# Patient Record
Sex: FEMALE | Race: WHITE | NOT HISPANIC OR LATINO | URBAN - METROPOLITAN AREA
[De-identification: names, ages, dates, MRNs, and addresses within clinical notes are randomized per-mention and may not be internally consistent; named-entity substitution may affect disease eponyms.]

---

## 2019-10-02 ENCOUNTER — EMERGENCY (EMERGENCY)
Facility: HOSPITAL | Age: 79
LOS: 1 days | Discharge: HOME | End: 2019-10-02
Attending: EMERGENCY MEDICINE
Payer: MEDICARE

## 2019-10-02 VITALS
OXYGEN SATURATION: 100 % | SYSTOLIC BLOOD PRESSURE: 108 MMHG | HEART RATE: 64 BPM | DIASTOLIC BLOOD PRESSURE: 74 MMHG | TEMPERATURE: 98 F | RESPIRATION RATE: 16 BRPM

## 2019-10-02 VITALS
OXYGEN SATURATION: 96 % | SYSTOLIC BLOOD PRESSURE: 158 MMHG | HEART RATE: 78 BPM | HEIGHT: 63 IN | TEMPERATURE: 98 F | WEIGHT: 134.04 LBS | RESPIRATION RATE: 18 BRPM | DIASTOLIC BLOOD PRESSURE: 79 MMHG

## 2019-10-02 DIAGNOSIS — Z90.710 ACQUIRED ABSENCE OF BOTH CERVIX AND UTERUS: Chronic | ICD-10-CM

## 2019-10-02 LAB
ALBUMIN SERPL ELPH-MCNC: 4.1 G/DL — SIGNIFICANT CHANGE UP (ref 3.3–5)
ALP SERPL-CCNC: 99 U/L — SIGNIFICANT CHANGE UP (ref 40–120)
ALT FLD-CCNC: 11 U/L — SIGNIFICANT CHANGE UP (ref 10–45)
ANION GAP SERPL CALC-SCNC: 14 MMOL/L — SIGNIFICANT CHANGE UP (ref 5–17)
APPEARANCE UR: CLEAR — SIGNIFICANT CHANGE UP
APTT BLD: 30.2 SEC — SIGNIFICANT CHANGE UP (ref 27.5–36.3)
AST SERPL-CCNC: 9 U/L — LOW (ref 10–40)
BACTERIA # UR AUTO: ABNORMAL
BASE EXCESS BLDV CALC-SCNC: -1.8 MMOL/L — SIGNIFICANT CHANGE UP (ref -2–2)
BASOPHILS # BLD AUTO: 0.05 K/UL — SIGNIFICANT CHANGE UP (ref 0–0.2)
BASOPHILS NFR BLD AUTO: 0.3 % — SIGNIFICANT CHANGE UP (ref 0–2)
BILIRUB SERPL-MCNC: 0.5 MG/DL — SIGNIFICANT CHANGE UP (ref 0.2–1.2)
BILIRUB UR-MCNC: NEGATIVE — SIGNIFICANT CHANGE UP
BUN SERPL-MCNC: 34 MG/DL — HIGH (ref 7–23)
CA-I SERPL-SCNC: 1.22 MMOL/L — SIGNIFICANT CHANGE UP (ref 1.12–1.3)
CALCIUM SERPL-MCNC: 9.9 MG/DL — SIGNIFICANT CHANGE UP (ref 8.4–10.5)
CHLORIDE BLDV-SCNC: 109 MMOL/L — HIGH (ref 96–108)
CHLORIDE SERPL-SCNC: 104 MMOL/L — SIGNIFICANT CHANGE UP (ref 96–108)
CO2 BLDV-SCNC: 24 MMOL/L — SIGNIFICANT CHANGE UP (ref 22–30)
CO2 SERPL-SCNC: 21 MMOL/L — LOW (ref 22–31)
COLOR SPEC: SIGNIFICANT CHANGE UP
CREAT SERPL-MCNC: 1.22 MG/DL — SIGNIFICANT CHANGE UP (ref 0.5–1.3)
DIFF PNL FLD: ABNORMAL
EOSINOPHIL # BLD AUTO: 0.12 K/UL — SIGNIFICANT CHANGE UP (ref 0–0.5)
EOSINOPHIL NFR BLD AUTO: 0.8 % — SIGNIFICANT CHANGE UP (ref 0–6)
EPI CELLS # UR: 1 /HPF — SIGNIFICANT CHANGE UP
GAS PNL BLDV: 137 MMOL/L — SIGNIFICANT CHANGE UP (ref 135–145)
GAS PNL BLDV: SIGNIFICANT CHANGE UP
GAS PNL BLDV: SIGNIFICANT CHANGE UP
GLUCOSE BLDV-MCNC: 199 MG/DL — HIGH (ref 70–99)
GLUCOSE SERPL-MCNC: 212 MG/DL — HIGH (ref 70–99)
GLUCOSE UR QL: ABNORMAL
HCO3 BLDV-SCNC: 23 MMOL/L — SIGNIFICANT CHANGE UP (ref 21–29)
HCT VFR BLD CALC: 37.4 % — SIGNIFICANT CHANGE UP (ref 34.5–45)
HCT VFR BLDA CALC: 39 % — SIGNIFICANT CHANGE UP (ref 39–50)
HGB BLD CALC-MCNC: 12.8 G/DL — SIGNIFICANT CHANGE UP (ref 11.5–15.5)
HGB BLD-MCNC: 12.4 G/DL — SIGNIFICANT CHANGE UP (ref 11.5–15.5)
HYALINE CASTS # UR AUTO: 2 /LPF — SIGNIFICANT CHANGE UP (ref 0–2)
IMM GRANULOCYTES NFR BLD AUTO: 0.4 % — SIGNIFICANT CHANGE UP (ref 0–1.5)
INR BLD: 0.97 RATIO — SIGNIFICANT CHANGE UP (ref 0.88–1.16)
KETONES UR-MCNC: SIGNIFICANT CHANGE UP
LACTATE BLDV-MCNC: 1.3 MMOL/L — SIGNIFICANT CHANGE UP (ref 0.7–2)
LEUKOCYTE ESTERASE UR-ACNC: ABNORMAL
LYMPHOCYTES # BLD AUTO: 1.45 K/UL — SIGNIFICANT CHANGE UP (ref 1–3.3)
LYMPHOCYTES # BLD AUTO: 9.8 % — LOW (ref 13–44)
MCHC RBC-ENTMCNC: 28.6 PG — SIGNIFICANT CHANGE UP (ref 27–34)
MCHC RBC-ENTMCNC: 33.2 GM/DL — SIGNIFICANT CHANGE UP (ref 32–36)
MCV RBC AUTO: 86.2 FL — SIGNIFICANT CHANGE UP (ref 80–100)
MONOCYTES # BLD AUTO: 1.12 K/UL — HIGH (ref 0–0.9)
MONOCYTES NFR BLD AUTO: 7.6 % — SIGNIFICANT CHANGE UP (ref 2–14)
NEUTROPHILS # BLD AUTO: 12.01 K/UL — HIGH (ref 1.8–7.4)
NEUTROPHILS NFR BLD AUTO: 81.1 % — HIGH (ref 43–77)
NITRITE UR-MCNC: POSITIVE
NRBC # BLD: 0 /100 WBCS — SIGNIFICANT CHANGE UP (ref 0–0)
PCO2 BLDV: 42 MMHG — SIGNIFICANT CHANGE UP (ref 35–50)
PH BLDV: 7.36 — SIGNIFICANT CHANGE UP (ref 7.35–7.45)
PH UR: 6 — SIGNIFICANT CHANGE UP (ref 5–8)
PLATELET # BLD AUTO: 270 K/UL — SIGNIFICANT CHANGE UP (ref 150–400)
PO2 BLDV: 26 MMHG — SIGNIFICANT CHANGE UP (ref 25–45)
POTASSIUM BLDV-SCNC: 4.1 MMOL/L — SIGNIFICANT CHANGE UP (ref 3.5–5.3)
POTASSIUM SERPL-MCNC: 4.4 MMOL/L — SIGNIFICANT CHANGE UP (ref 3.5–5.3)
POTASSIUM SERPL-SCNC: 4.4 MMOL/L — SIGNIFICANT CHANGE UP (ref 3.5–5.3)
PROT SERPL-MCNC: 7.3 G/DL — SIGNIFICANT CHANGE UP (ref 6–8.3)
PROT UR-MCNC: SIGNIFICANT CHANGE UP
PROTHROM AB SERPL-ACNC: 11.2 SEC — SIGNIFICANT CHANGE UP (ref 10–12.9)
RBC # BLD: 4.34 M/UL — SIGNIFICANT CHANGE UP (ref 3.8–5.2)
RBC # FLD: 13.5 % — SIGNIFICANT CHANGE UP (ref 10.3–14.5)
RBC CASTS # UR COMP ASSIST: 40 /HPF — HIGH (ref 0–4)
SAO2 % BLDV: 40 % — LOW (ref 67–88)
SODIUM SERPL-SCNC: 139 MMOL/L — SIGNIFICANT CHANGE UP (ref 135–145)
SP GR SPEC: 1.02 — SIGNIFICANT CHANGE UP (ref 1.01–1.02)
UROBILINOGEN FLD QL: NEGATIVE — SIGNIFICANT CHANGE UP
WBC # BLD: 14.81 K/UL — HIGH (ref 3.8–10.5)
WBC # FLD AUTO: 14.81 K/UL — HIGH (ref 3.8–10.5)
WBC UR QL: 45 /HPF — HIGH (ref 0–5)

## 2019-10-02 PROCEDURE — 99284 EMERGENCY DEPT VISIT MOD MDM: CPT

## 2019-10-02 PROCEDURE — 83605 ASSAY OF LACTIC ACID: CPT

## 2019-10-02 PROCEDURE — 96365 THER/PROPH/DIAG IV INF INIT: CPT | Mod: XU

## 2019-10-02 PROCEDURE — 87186 SC STD MICRODIL/AGAR DIL: CPT

## 2019-10-02 PROCEDURE — 84132 ASSAY OF SERUM POTASSIUM: CPT

## 2019-10-02 PROCEDURE — 80053 COMPREHEN METABOLIC PANEL: CPT

## 2019-10-02 PROCEDURE — 85610 PROTHROMBIN TIME: CPT

## 2019-10-02 PROCEDURE — 85027 COMPLETE CBC AUTOMATED: CPT

## 2019-10-02 PROCEDURE — 85730 THROMBOPLASTIN TIME PARTIAL: CPT

## 2019-10-02 PROCEDURE — 81001 URINALYSIS AUTO W/SCOPE: CPT

## 2019-10-02 PROCEDURE — 87086 URINE CULTURE/COLONY COUNT: CPT

## 2019-10-02 PROCEDURE — 85014 HEMATOCRIT: CPT

## 2019-10-02 PROCEDURE — 82947 ASSAY GLUCOSE BLOOD QUANT: CPT

## 2019-10-02 PROCEDURE — 82330 ASSAY OF CALCIUM: CPT

## 2019-10-02 PROCEDURE — 51701 INSERT BLADDER CATHETER: CPT

## 2019-10-02 PROCEDURE — 84295 ASSAY OF SERUM SODIUM: CPT

## 2019-10-02 PROCEDURE — 82803 BLOOD GASES ANY COMBINATION: CPT

## 2019-10-02 PROCEDURE — 82435 ASSAY OF BLOOD CHLORIDE: CPT

## 2019-10-02 PROCEDURE — 99284 EMERGENCY DEPT VISIT MOD MDM: CPT | Mod: 25

## 2019-10-02 RX ORDER — CEFDINIR 250 MG/5ML
1 POWDER, FOR SUSPENSION ORAL
Qty: 14 | Refills: 0
Start: 2019-10-02 | End: 2019-10-08

## 2019-10-02 RX ORDER — CEFTRIAXONE 500 MG/1
1000 INJECTION, POWDER, FOR SOLUTION INTRAMUSCULAR; INTRAVENOUS ONCE
Refills: 0 | Status: COMPLETED | OUTPATIENT
Start: 2019-10-02 | End: 2019-10-02

## 2019-10-02 RX ADMIN — CEFTRIAXONE 1000 MILLIGRAM(S): 500 INJECTION, POWDER, FOR SOLUTION INTRAMUSCULAR; INTRAVENOUS at 16:18

## 2019-10-02 RX ADMIN — CEFTRIAXONE 100 MILLIGRAM(S): 500 INJECTION, POWDER, FOR SOLUTION INTRAMUSCULAR; INTRAVENOUS at 15:07

## 2019-10-02 NOTE — ED PROVIDER NOTE - PHYSICAL EXAMINATION
GEN: Well Appearing, Nontoxic, NAD  HEENT: NC/AT, Symm Facies. PERRL, EOMI, MMM, posterior pharynx clear  CV: No JVD/Bruits or stridor;  +S1S2, RRR w/o m/g/r  RESP: CTAB w/o w/r/r  ABD: Soft, nt/nd, +BS. No guarding/rebound. No RUQ tender, no CVAT  : external genitalia with granulomatous tissue on labia, enlarged.   EXT/MSK: No lower extremity edema or calf tenderness. WWP, palpable pulses. FROMx4  SKIN: No erythema, lesions or rash  Neuro: Grossly intact, AOX3 with normal speech, CN II-XII intact; Sensation intact, motor 5/5 throughout. Gait normal

## 2019-10-02 NOTE — ED PROVIDER NOTE - NSFOLLOWUPINSTRUCTIONS_ED_ALL_ED_FT
1. Continue to hydrate yourself well   2. Take Cefdinir as directed to completion   3. Follow-up with Dr. Hernández and your OBGYN this week for reevaluation and continued treatment   4. Return to the ER for any new worsening symptoms

## 2019-10-02 NOTE — ED ADULT NURSE REASSESSMENT NOTE - NS ED NURSE REASSESS COMMENT FT1
Bladder scan showed 247cc urine. Straight cath performed under sterile technique - 250cc urine clear, yellow urine noted to drainage bag. Hygienic and comfort measures provided. Pt repositioned for comfort. Bed locked in lowest position. Call bell within reach. Spouse at bedside. Urine specimens collected and sent. Will continue to monitor.

## 2019-10-02 NOTE — ED PROVIDER NOTE - ATTENDING CONTRIBUTION TO CARE
79yr F hx of htn, hl, dm not on insulin p/w recurrence of urinary frequency and dysuria despite two courses of antibiotics (cipro and another AB which she does not remember the name of). denies fever chills, n/v, abd pain, cp, sob, ... 79yr F hx of htn, hl, dm not on insulin p/w recurrence of urinary frequency and dysuria despite two courses of antibiotics (cipro and another AB which she does not remember the name of). denies fever chills, n/v, abd pain, cp, sob, change in bowel habits (usually 2 bm per week).  reports being scheduled by gyn and urology for oct 8th for a corrective procedure to help remove excess tissue to be able to urinate freely.  exam is unremarkable (pelvic not done)  will do labs, urine, and bladder scan. likely will require inpatient antibiotics if evidence of infection. 79yr F hx of htn, hl, dm not on insulin p/w recurrence of urinary frequency and dysuria despite two courses of antibiotics (cipro and another AB which she does not remember the name of). denies fever chills, n/v, abd pain, cp, sob, change in bowel habits (usually 2 bm per week).  reports being scheduled by gyn and urology for oct 8th for a corrective procedure to help remove excess tissue to be able to urinate freely.  exam is unremarkable (pelvic not done)  will do labs, urine, and bladder scan. likely will require inpatient antibiotics if evidence of infection.    clarified with pt's PMD that she was not on antibiotics recently, thus no concern for treatment failure. will send home on cefdinir (last time was positive). is agreeable for the plan, also offered rapid urology follow up if she chooses to have operation here.

## 2019-10-02 NOTE — ED PROVIDER NOTE - PATIENT PORTAL LINK FT
You can access the FollowMyHealth Patient Portal offered by [facility] by registering at the following website: [email or web address]. By joining [facility]’s FollowMyHealth portal, you will also be able to view your health information using other applications (apps) compatible with our system.

## 2019-10-02 NOTE — ED PROVIDER NOTE - NS ED ROS FT
Constitutional: No fever or chills  Eyes: No visual changes, eye pain or redness  HEENT: No throat pain, ear pain, nasal pain. No nose bleeding.  CV: No chest pain or lower extremity edema  Resp: No SOB no cough  GI: No abd pain. No nausea or vomiting. No diarrhea. No constipation.   : +dysuria, incontinence. No hematuria  MSK: No musculoskeletal pain  Skin: No rash  Neuro: No headache. No numbness or tingling. No weakness.

## 2019-10-02 NOTE — ED ADULT NURSE REASSESSMENT NOTE - NS ED NURSE REASSESS COMMENT FT1
Pt resting comfortably in bed. Pt denies pain/discomfort at this time. Ceftriaxone administered to pt as ordered. NAD noted. No adverse reaction noted. Pt given lunch tray, no dysphagia noted, tolerating PO intake well . Safety maintained. Spouse at bedside. Will continue to monitor. Pending dispo.

## 2019-10-02 NOTE — ED ADULT NURSE NOTE - OBJECTIVE STATEMENT
79F pt AxOx3 ambulatory to ED c/o lower abd pain and frequent urination w/ burning. Pt denies fever/chills. Pt denies hematuria. Pt denies dizziness/generalized weakness. On assessment, abd soft/NT/ND. Suprapubic tenderness noted to palp. Pt is afebrile. VSS. Pt is well in appearance. Resp even, unlabored. Pt ambulates w/ steady gait. #20G RAC, labs drawn and sent. Call bell within reach. Safety maintained. Spouse at bedside. MD at bedside for eval. Will continue to monitor.

## 2019-10-02 NOTE — ED PROVIDER NOTE - PROGRESS NOTE DETAILS
Spoke with patient's pmd, Dr. Hernández, stating that patient was on cipro in June and then most recently treated with cefdinir in July for UTI and clearance for this lysis of labial fusion that she has scheduled this month. He reports that his urine culture from July grew E.coli and sensitivities showed sensitive to all cephalosporins. Patient reported that she was improved after that treatment and he has not seen her since that time. Reports that he is in agreement with plan for d/c on cephalosporin and he will call this week, ED After Care phone number provided to Dr. Hernández, to find results of urine culture and states he will follow-up with her this week in the office. Will provide cephalospirin here and for d/c home. -Yodit Deleon PA-C

## 2019-10-02 NOTE — ED PROVIDER NOTE - OBJECTIVE STATEMENT
80 y/o F pmhx htn, hld, DM2, presenting with urinary symptoms worsening x1 week. Patient has history of uterine prolapse, recently worked up for increased tissue in the area and scheduled to have a lysis of labial fusion on 10/8 secondary to this issue. She reports that she has been having urinary symptoms for months, and has been on antibiotics x2 in the past few months, stating recently on cipro and another antibiotic she can't remember, She reports that for the past week she has been experiencing worsening pain and burning with urination, as well as increased urinary incontinence. States that she drips urine all the time. Denies any fevers, chills, abdominal pain, back pain, nausea, vomiting.

## 2019-10-04 NOTE — ED POST DISCHARGE NOTE - RESULT SUMMARY
UCX >100k gnr  prelim no sens pt on cefdinir will follow up sens to see if abx need to be changed - Mayra Hall PA-C

## 2019-10-04 NOTE — ED POST DISCHARGE NOTE - OTHER COMMUNICATION
Pt obgyn office called (dr. salvador) regarding cx results, prelim faxed to office. office will call monday for final results - Mayra Hall PA-C

## 2019-10-04 NOTE — ED POST DISCHARGE NOTE - ADDITIONAL DOCUMENTATION
10/5/19: Final Ucx >100K E. coli, pansensitive to cephalosporins, pt d/cd on cefdinir and treated appropriately no need for further pt contact. -Elissa Castelan PA-C

## 2023-12-16 ENCOUNTER — TRANSCRIPTION ENCOUNTER (OUTPATIENT)
Age: 83
End: 2023-12-16

## 2023-12-17 ENCOUNTER — INPATIENT (INPATIENT)
Facility: HOSPITAL | Age: 83
LOS: 2 days | Discharge: NURSING, REHAB OR LONG-TERM CARE | DRG: 481 | End: 2023-12-20
Attending: STUDENT IN AN ORGANIZED HEALTH CARE EDUCATION/TRAINING PROGRAM | Admitting: STUDENT IN AN ORGANIZED HEALTH CARE EDUCATION/TRAINING PROGRAM
Payer: MEDICARE

## 2023-12-17 ENCOUNTER — TRANSCRIPTION ENCOUNTER (OUTPATIENT)
Age: 83
End: 2023-12-17

## 2023-12-17 VITALS
WEIGHT: 119.93 LBS | DIASTOLIC BLOOD PRESSURE: 60 MMHG | SYSTOLIC BLOOD PRESSURE: 172 MMHG | RESPIRATION RATE: 16 BRPM | TEMPERATURE: 97 F | OXYGEN SATURATION: 97 % | HEART RATE: 72 BPM | HEIGHT: 60 IN

## 2023-12-17 DIAGNOSIS — S72.001A FRACTURE OF UNSPECIFIED PART OF NECK OF RIGHT FEMUR, INITIAL ENCOUNTER FOR CLOSED FRACTURE: ICD-10-CM

## 2023-12-17 DIAGNOSIS — I10 ESSENTIAL (PRIMARY) HYPERTENSION: ICD-10-CM

## 2023-12-17 DIAGNOSIS — Z90.710 ACQUIRED ABSENCE OF BOTH CERVIX AND UTERUS: Chronic | ICD-10-CM

## 2023-12-17 DIAGNOSIS — R52 PAIN, UNSPECIFIED: ICD-10-CM

## 2023-12-17 DIAGNOSIS — E11.9 TYPE 2 DIABETES MELLITUS WITHOUT COMPLICATIONS: ICD-10-CM

## 2023-12-17 DIAGNOSIS — S72.101A UNSPECIFIED TROCHANTERIC FRACTURE OF RIGHT FEMUR, INITIAL ENCOUNTER FOR CLOSED FRACTURE: ICD-10-CM

## 2023-12-17 LAB
ALBUMIN SERPL ELPH-MCNC: 4.1 G/DL — SIGNIFICANT CHANGE UP (ref 3.3–5)
ALBUMIN SERPL ELPH-MCNC: 4.1 G/DL — SIGNIFICANT CHANGE UP (ref 3.3–5)
ALP SERPL-CCNC: 78 U/L — SIGNIFICANT CHANGE UP (ref 40–120)
ALP SERPL-CCNC: 78 U/L — SIGNIFICANT CHANGE UP (ref 40–120)
ALT FLD-CCNC: 19 U/L — SIGNIFICANT CHANGE UP (ref 10–45)
ALT FLD-CCNC: 19 U/L — SIGNIFICANT CHANGE UP (ref 10–45)
ANION GAP SERPL CALC-SCNC: 11 MMOL/L — SIGNIFICANT CHANGE UP (ref 5–17)
ANION GAP SERPL CALC-SCNC: 11 MMOL/L — SIGNIFICANT CHANGE UP (ref 5–17)
ANION GAP SERPL CALC-SCNC: 12 MMOL/L — SIGNIFICANT CHANGE UP (ref 5–17)
ANION GAP SERPL CALC-SCNC: 12 MMOL/L — SIGNIFICANT CHANGE UP (ref 5–17)
APTT BLD: 30.3 SEC — SIGNIFICANT CHANGE UP (ref 24.5–35.6)
APTT BLD: 30.3 SEC — SIGNIFICANT CHANGE UP (ref 24.5–35.6)
AST SERPL-CCNC: 22 U/L — SIGNIFICANT CHANGE UP (ref 10–40)
AST SERPL-CCNC: 22 U/L — SIGNIFICANT CHANGE UP (ref 10–40)
BASOPHILS # BLD AUTO: 0.09 K/UL — SIGNIFICANT CHANGE UP (ref 0–0.2)
BASOPHILS # BLD AUTO: 0.09 K/UL — SIGNIFICANT CHANGE UP (ref 0–0.2)
BASOPHILS NFR BLD AUTO: 0.7 % — SIGNIFICANT CHANGE UP (ref 0–2)
BASOPHILS NFR BLD AUTO: 0.7 % — SIGNIFICANT CHANGE UP (ref 0–2)
BILIRUB SERPL-MCNC: 0.5 MG/DL — SIGNIFICANT CHANGE UP (ref 0.2–1.2)
BILIRUB SERPL-MCNC: 0.5 MG/DL — SIGNIFICANT CHANGE UP (ref 0.2–1.2)
BUN SERPL-MCNC: 25 MG/DL — HIGH (ref 7–23)
BUN SERPL-MCNC: 25 MG/DL — HIGH (ref 7–23)
BUN SERPL-MCNC: 26 MG/DL — HIGH (ref 7–23)
BUN SERPL-MCNC: 26 MG/DL — HIGH (ref 7–23)
CALCIUM SERPL-MCNC: 9.3 MG/DL — SIGNIFICANT CHANGE UP (ref 8.4–10.5)
CALCIUM SERPL-MCNC: 9.3 MG/DL — SIGNIFICANT CHANGE UP (ref 8.4–10.5)
CALCIUM SERPL-MCNC: 9.6 MG/DL — SIGNIFICANT CHANGE UP (ref 8.4–10.5)
CALCIUM SERPL-MCNC: 9.6 MG/DL — SIGNIFICANT CHANGE UP (ref 8.4–10.5)
CHLORIDE SERPL-SCNC: 102 MMOL/L — SIGNIFICANT CHANGE UP (ref 96–108)
CHLORIDE SERPL-SCNC: 102 MMOL/L — SIGNIFICANT CHANGE UP (ref 96–108)
CHLORIDE SERPL-SCNC: 103 MMOL/L — SIGNIFICANT CHANGE UP (ref 96–108)
CHLORIDE SERPL-SCNC: 103 MMOL/L — SIGNIFICANT CHANGE UP (ref 96–108)
CO2 SERPL-SCNC: 23 MMOL/L — SIGNIFICANT CHANGE UP (ref 22–31)
CREAT SERPL-MCNC: 0.97 MG/DL — SIGNIFICANT CHANGE UP (ref 0.5–1.3)
CREAT SERPL-MCNC: 0.97 MG/DL — SIGNIFICANT CHANGE UP (ref 0.5–1.3)
CREAT SERPL-MCNC: 1.01 MG/DL — SIGNIFICANT CHANGE UP (ref 0.5–1.3)
CREAT SERPL-MCNC: 1.01 MG/DL — SIGNIFICANT CHANGE UP (ref 0.5–1.3)
EGFR: 55 ML/MIN/1.73M2 — LOW
EGFR: 55 ML/MIN/1.73M2 — LOW
EGFR: 58 ML/MIN/1.73M2 — LOW
EGFR: 58 ML/MIN/1.73M2 — LOW
EOSINOPHIL # BLD AUTO: 0.2 K/UL — SIGNIFICANT CHANGE UP (ref 0–0.5)
EOSINOPHIL # BLD AUTO: 0.2 K/UL — SIGNIFICANT CHANGE UP (ref 0–0.5)
EOSINOPHIL NFR BLD AUTO: 1.5 % — SIGNIFICANT CHANGE UP (ref 0–6)
EOSINOPHIL NFR BLD AUTO: 1.5 % — SIGNIFICANT CHANGE UP (ref 0–6)
GLUCOSE BLDC GLUCOMTR-MCNC: 222 MG/DL — HIGH (ref 70–99)
GLUCOSE BLDC GLUCOMTR-MCNC: 222 MG/DL — HIGH (ref 70–99)
GLUCOSE BLDC GLUCOMTR-MCNC: 87 MG/DL — SIGNIFICANT CHANGE UP (ref 70–99)
GLUCOSE BLDC GLUCOMTR-MCNC: 87 MG/DL — SIGNIFICANT CHANGE UP (ref 70–99)
GLUCOSE SERPL-MCNC: 78 MG/DL — SIGNIFICANT CHANGE UP (ref 70–99)
GLUCOSE SERPL-MCNC: 78 MG/DL — SIGNIFICANT CHANGE UP (ref 70–99)
GLUCOSE SERPL-MCNC: 93 MG/DL — SIGNIFICANT CHANGE UP (ref 70–99)
GLUCOSE SERPL-MCNC: 93 MG/DL — SIGNIFICANT CHANGE UP (ref 70–99)
HCT VFR BLD CALC: 30 % — LOW (ref 34.5–45)
HCT VFR BLD CALC: 30 % — LOW (ref 34.5–45)
HCT VFR BLD CALC: 35 % — SIGNIFICANT CHANGE UP (ref 34.5–45)
HCT VFR BLD CALC: 35 % — SIGNIFICANT CHANGE UP (ref 34.5–45)
HGB BLD-MCNC: 11.1 G/DL — LOW (ref 11.5–15.5)
HGB BLD-MCNC: 11.1 G/DL — LOW (ref 11.5–15.5)
HGB BLD-MCNC: 9.7 G/DL — LOW (ref 11.5–15.5)
HGB BLD-MCNC: 9.7 G/DL — LOW (ref 11.5–15.5)
IMM GRANULOCYTES NFR BLD AUTO: 0.4 % — SIGNIFICANT CHANGE UP (ref 0–0.9)
IMM GRANULOCYTES NFR BLD AUTO: 0.4 % — SIGNIFICANT CHANGE UP (ref 0–0.9)
INR BLD: 0.95 RATIO — SIGNIFICANT CHANGE UP (ref 0.85–1.18)
INR BLD: 0.95 RATIO — SIGNIFICANT CHANGE UP (ref 0.85–1.18)
LYMPHOCYTES # BLD AUTO: 1.15 K/UL — SIGNIFICANT CHANGE UP (ref 1–3.3)
LYMPHOCYTES # BLD AUTO: 1.15 K/UL — SIGNIFICANT CHANGE UP (ref 1–3.3)
LYMPHOCYTES # BLD AUTO: 8.4 % — LOW (ref 13–44)
LYMPHOCYTES # BLD AUTO: 8.4 % — LOW (ref 13–44)
MAGNESIUM SERPL-MCNC: 1.3 MG/DL — LOW (ref 1.6–2.6)
MAGNESIUM SERPL-MCNC: 1.3 MG/DL — LOW (ref 1.6–2.6)
MCHC RBC-ENTMCNC: 27.7 PG — SIGNIFICANT CHANGE UP (ref 27–34)
MCHC RBC-ENTMCNC: 31.7 GM/DL — LOW (ref 32–36)
MCHC RBC-ENTMCNC: 31.7 GM/DL — LOW (ref 32–36)
MCHC RBC-ENTMCNC: 32.3 GM/DL — SIGNIFICANT CHANGE UP (ref 32–36)
MCHC RBC-ENTMCNC: 32.3 GM/DL — SIGNIFICANT CHANGE UP (ref 32–36)
MCV RBC AUTO: 85.7 FL — SIGNIFICANT CHANGE UP (ref 80–100)
MCV RBC AUTO: 85.7 FL — SIGNIFICANT CHANGE UP (ref 80–100)
MCV RBC AUTO: 87.3 FL — SIGNIFICANT CHANGE UP (ref 80–100)
MCV RBC AUTO: 87.3 FL — SIGNIFICANT CHANGE UP (ref 80–100)
MONOCYTES # BLD AUTO: 0.81 K/UL — SIGNIFICANT CHANGE UP (ref 0–0.9)
MONOCYTES # BLD AUTO: 0.81 K/UL — SIGNIFICANT CHANGE UP (ref 0–0.9)
MONOCYTES NFR BLD AUTO: 5.9 % — SIGNIFICANT CHANGE UP (ref 2–14)
MONOCYTES NFR BLD AUTO: 5.9 % — SIGNIFICANT CHANGE UP (ref 2–14)
NEUTROPHILS # BLD AUTO: 11.33 K/UL — HIGH (ref 1.8–7.4)
NEUTROPHILS # BLD AUTO: 11.33 K/UL — HIGH (ref 1.8–7.4)
NEUTROPHILS NFR BLD AUTO: 83.1 % — HIGH (ref 43–77)
NEUTROPHILS NFR BLD AUTO: 83.1 % — HIGH (ref 43–77)
NRBC # BLD: 0 /100 WBCS — SIGNIFICANT CHANGE UP (ref 0–0)
PLATELET # BLD AUTO: 240 K/UL — SIGNIFICANT CHANGE UP (ref 150–400)
PLATELET # BLD AUTO: 240 K/UL — SIGNIFICANT CHANGE UP (ref 150–400)
PLATELET # BLD AUTO: 257 K/UL — SIGNIFICANT CHANGE UP (ref 150–400)
PLATELET # BLD AUTO: 257 K/UL — SIGNIFICANT CHANGE UP (ref 150–400)
POTASSIUM SERPL-MCNC: 4 MMOL/L — SIGNIFICANT CHANGE UP (ref 3.5–5.3)
POTASSIUM SERPL-MCNC: 4 MMOL/L — SIGNIFICANT CHANGE UP (ref 3.5–5.3)
POTASSIUM SERPL-MCNC: 4.1 MMOL/L — SIGNIFICANT CHANGE UP (ref 3.5–5.3)
POTASSIUM SERPL-MCNC: 4.1 MMOL/L — SIGNIFICANT CHANGE UP (ref 3.5–5.3)
POTASSIUM SERPL-SCNC: 4 MMOL/L — SIGNIFICANT CHANGE UP (ref 3.5–5.3)
POTASSIUM SERPL-SCNC: 4 MMOL/L — SIGNIFICANT CHANGE UP (ref 3.5–5.3)
POTASSIUM SERPL-SCNC: 4.1 MMOL/L — SIGNIFICANT CHANGE UP (ref 3.5–5.3)
POTASSIUM SERPL-SCNC: 4.1 MMOL/L — SIGNIFICANT CHANGE UP (ref 3.5–5.3)
PROT SERPL-MCNC: 6.7 G/DL — SIGNIFICANT CHANGE UP (ref 6–8.3)
PROT SERPL-MCNC: 6.7 G/DL — SIGNIFICANT CHANGE UP (ref 6–8.3)
PROTHROM AB SERPL-ACNC: 10.5 SEC — SIGNIFICANT CHANGE UP (ref 9.5–13)
PROTHROM AB SERPL-ACNC: 10.5 SEC — SIGNIFICANT CHANGE UP (ref 9.5–13)
RBC # BLD: 3.5 M/UL — LOW (ref 3.8–5.2)
RBC # BLD: 3.5 M/UL — LOW (ref 3.8–5.2)
RBC # BLD: 4.01 M/UL — SIGNIFICANT CHANGE UP (ref 3.8–5.2)
RBC # BLD: 4.01 M/UL — SIGNIFICANT CHANGE UP (ref 3.8–5.2)
RBC # FLD: 13.4 % — SIGNIFICANT CHANGE UP (ref 10.3–14.5)
SODIUM SERPL-SCNC: 137 MMOL/L — SIGNIFICANT CHANGE UP (ref 135–145)
WBC # BLD: 13.64 K/UL — HIGH (ref 3.8–10.5)
WBC # BLD: 13.64 K/UL — HIGH (ref 3.8–10.5)
WBC # BLD: 14.87 K/UL — HIGH (ref 3.8–10.5)
WBC # BLD: 14.87 K/UL — HIGH (ref 3.8–10.5)
WBC # FLD AUTO: 13.64 K/UL — HIGH (ref 3.8–10.5)
WBC # FLD AUTO: 13.64 K/UL — HIGH (ref 3.8–10.5)
WBC # FLD AUTO: 14.87 K/UL — HIGH (ref 3.8–10.5)
WBC # FLD AUTO: 14.87 K/UL — HIGH (ref 3.8–10.5)

## 2023-12-17 PROCEDURE — 99285 EMERGENCY DEPT VISIT HI MDM: CPT | Mod: GC

## 2023-12-17 PROCEDURE — 99221 1ST HOSP IP/OBS SF/LOW 40: CPT | Mod: 57

## 2023-12-17 PROCEDURE — 27245 TREAT THIGH FRACTURE: CPT | Mod: RT

## 2023-12-17 PROCEDURE — 70450 CT HEAD/BRAIN W/O DYE: CPT | Mod: 26

## 2023-12-17 PROCEDURE — 73502 X-RAY EXAM HIP UNI 2-3 VIEWS: CPT | Mod: 26,RT

## 2023-12-17 PROCEDURE — 72170 X-RAY EXAM OF PELVIS: CPT | Mod: 26,59

## 2023-12-17 PROCEDURE — 73562 X-RAY EXAM OF KNEE 3: CPT | Mod: 26,RT

## 2023-12-17 PROCEDURE — 71045 X-RAY EXAM CHEST 1 VIEW: CPT | Mod: 26

## 2023-12-17 PROCEDURE — 73552 X-RAY EXAM OF FEMUR 2/>: CPT | Mod: 26,RT

## 2023-12-17 DEVICE — SCREW LAG 10.5X90MM: Type: IMPLANTABLE DEVICE | Site: RIGHT | Status: FUNCTIONAL

## 2023-12-17 DEVICE — PIN GUIDE 3.2X444: Type: IMPLANTABLE DEVICE | Site: RIGHT | Status: FUNCTIONAL

## 2023-12-17 DEVICE — IMPLANTABLE DEVICE: Type: IMPLANTABLE DEVICE | Site: RIGHT | Status: FUNCTIONAL

## 2023-12-17 RX ORDER — OXYCODONE HYDROCHLORIDE 5 MG/1
5 TABLET ORAL EVERY 4 HOURS
Refills: 0 | Status: DISCONTINUED | OUTPATIENT
Start: 2023-12-17 | End: 2023-12-20

## 2023-12-17 RX ORDER — ACETAMINOPHEN 500 MG
1000 TABLET ORAL ONCE
Refills: 0 | Status: COMPLETED | OUTPATIENT
Start: 2023-12-17 | End: 2023-12-17

## 2023-12-17 RX ORDER — SODIUM CHLORIDE 9 MG/ML
1000 INJECTION, SOLUTION INTRAVENOUS
Refills: 0 | Status: DISCONTINUED | OUTPATIENT
Start: 2023-12-17 | End: 2023-12-20

## 2023-12-17 RX ORDER — HYDROMORPHONE HYDROCHLORIDE 2 MG/ML
0.25 INJECTION INTRAMUSCULAR; INTRAVENOUS; SUBCUTANEOUS
Refills: 0 | Status: DISCONTINUED | OUTPATIENT
Start: 2023-12-17 | End: 2023-12-17

## 2023-12-17 RX ORDER — TRAMADOL HYDROCHLORIDE 50 MG/1
50 TABLET ORAL EVERY 4 HOURS
Refills: 0 | Status: DISCONTINUED | OUTPATIENT
Start: 2023-12-17 | End: 2023-12-20

## 2023-12-17 RX ORDER — ATORVASTATIN CALCIUM 80 MG/1
40 TABLET, FILM COATED ORAL AT BEDTIME
Refills: 0 | Status: DISCONTINUED | OUTPATIENT
Start: 2023-12-17 | End: 2023-12-20

## 2023-12-17 RX ORDER — METFORMIN HYDROCHLORIDE 850 MG/1
500 TABLET ORAL
Refills: 0 | Status: DISCONTINUED | OUTPATIENT
Start: 2023-12-17 | End: 2023-12-20

## 2023-12-17 RX ORDER — ONDANSETRON 8 MG/1
4 TABLET, FILM COATED ORAL EVERY 8 HOURS
Refills: 0 | Status: DISCONTINUED | OUTPATIENT
Start: 2023-12-17 | End: 2023-12-20

## 2023-12-17 RX ORDER — ENOXAPARIN SODIUM 100 MG/ML
40 INJECTION SUBCUTANEOUS EVERY 24 HOURS
Refills: 0 | Status: DISCONTINUED | OUTPATIENT
Start: 2023-12-18 | End: 2023-12-20

## 2023-12-17 RX ORDER — ASPIRIN/CALCIUM CARB/MAGNESIUM 324 MG
81 TABLET ORAL DAILY
Refills: 0 | Status: DISCONTINUED | OUTPATIENT
Start: 2023-12-17 | End: 2023-12-20

## 2023-12-17 RX ORDER — SODIUM CHLORIDE 9 MG/ML
1000 INJECTION INTRAMUSCULAR; INTRAVENOUS; SUBCUTANEOUS
Refills: 0 | Status: DISCONTINUED | OUTPATIENT
Start: 2023-12-17 | End: 2023-12-20

## 2023-12-17 RX ORDER — ONDANSETRON 8 MG/1
4 TABLET, FILM COATED ORAL ONCE
Refills: 0 | Status: DISCONTINUED | OUTPATIENT
Start: 2023-12-17 | End: 2023-12-17

## 2023-12-17 RX ORDER — OXYCODONE HYDROCHLORIDE 5 MG/1
2.5 TABLET ORAL EVERY 4 HOURS
Refills: 0 | Status: DISCONTINUED | OUTPATIENT
Start: 2023-12-17 | End: 2023-12-20

## 2023-12-17 RX ORDER — CEFAZOLIN SODIUM 1 G
2000 VIAL (EA) INJECTION EVERY 8 HOURS
Refills: 0 | Status: COMPLETED | OUTPATIENT
Start: 2023-12-17 | End: 2023-12-18

## 2023-12-17 RX ORDER — LISINOPRIL 2.5 MG/1
20 TABLET ORAL DAILY
Refills: 0 | Status: DISCONTINUED | OUTPATIENT
Start: 2023-12-17 | End: 2023-12-18

## 2023-12-17 RX ORDER — PHENAZOPYRIDINE HCL 100 MG
200 TABLET ORAL THREE TIMES A DAY
Refills: 0 | Status: DISCONTINUED | OUTPATIENT
Start: 2023-12-17 | End: 2023-12-20

## 2023-12-17 RX ORDER — PANTOPRAZOLE SODIUM 20 MG/1
40 TABLET, DELAYED RELEASE ORAL
Refills: 0 | Status: DISCONTINUED | OUTPATIENT
Start: 2023-12-17 | End: 2023-12-20

## 2023-12-17 RX ADMIN — TRAMADOL HYDROCHLORIDE 50 MILLIGRAM(S): 50 TABLET ORAL at 23:30

## 2023-12-17 RX ADMIN — Medication 400 MILLIGRAM(S): at 12:26

## 2023-12-17 RX ADMIN — Medication 1000 MILLIGRAM(S): at 13:00

## 2023-12-17 RX ADMIN — Medication 100 MILLIGRAM(S): at 22:17

## 2023-12-17 RX ADMIN — SODIUM CHLORIDE 125 MILLILITER(S): 9 INJECTION INTRAMUSCULAR; INTRAVENOUS; SUBCUTANEOUS at 15:36

## 2023-12-17 RX ADMIN — Medication 400 MILLIGRAM(S): at 19:50

## 2023-12-17 RX ADMIN — Medication 200 MILLIGRAM(S): at 22:17

## 2023-12-17 RX ADMIN — ATORVASTATIN CALCIUM 40 MILLIGRAM(S): 80 TABLET, FILM COATED ORAL at 22:17

## 2023-12-17 NOTE — CONSULT NOTE ADULT - PROBLEM SELECTOR RECOMMENDATION 9
Patient scheduled for an Open reduction and internal fixation of the right hip  No contraindication to scheduled procedure  Patient is NPO  DVT and GO rpophylaxis as per ortho Patient scheduled for an Open reduction and internal fixation of the right hip  No contraindication to scheduled procedure  Patient is NPO  DVT and prophylaxis as per ortho

## 2023-12-17 NOTE — ED ADULT NURSE REASSESSMENT NOTE - NS ED NURSE REASSESS COMMENT FT1
Pt placed on bed pan. Skin intact, diaper changed. Placed on primafit. Readjusted in bed. Pending bed assignment. Side rails up, bed in lowest position, safety maintained.

## 2023-12-17 NOTE — CONSULT NOTE ADULT - PROBLEM SELECTOR RECOMMENDATION 2
will need to clarify antihypertensive meds  continue to monitor BP  will adjust meds as needed  Low sodium diet

## 2023-12-17 NOTE — CONSULT NOTE ADULT - SUBJECTIVE AND OBJECTIVE BOX
84 y/o F PmHx DM, HTN and Hypercholesterolemia presents following fall onto R-side. Patient states she slipped while in the kitchen at 9:30AM. Following the fall c/o R hip pain. Denies head strike, LOC, nausea, vomiting. She denies numbness/tingling b/l lower extremity, bowel and bladder incontinence. The patient was unable to bear weight on her R leg following the fall. Denies upper extremity pain, chest pain, SOB. She was brought to [facility] for further evaluation and treatment. In the ED she was found to have a right hip fracture and is scheduled for an Open reduction and internal fixation of the left hip. Patient seen now resting comfortably      PAST MEDICAL & SURGICAL HISTORY:  Diabetes      Hypertension      High cholesterol      H/O abdominal hysterectomy            MEDICATIONS  (STANDING):  aspirin enteric coated 81 milliGRAM(s) Oral daily  atorvastatin 40 milliGRAM(s) Oral at bedtime  phenazopyridine 200 milliGRAM(s) Oral three times a day  sodium chloride 0.9%. 1000 milliLiter(s) (125 mL/Hr) IV Continuous <Continuous>    MEDICATIONS  (PRN):    Social Hx:  Tobacco: neg  ETOH: Occasionally  Drugs:  Neg    Family Hx:  As per my conversation with the patient, non contributory         ROS  CONSTITUTIONAL: No weakness, fevers or chills  EYES/ENT: No visual changes;  No vertigo or throat pain   NECK: No pain or stiffness  RESPIRATORY: No cough, wheezing, hemoptysis; No shortness of breath  CARDIOVASCULAR: No chest pain or palpitations  GASTROINTESTINAL: No abdominal or epigastric pain. No nausea, vomiting, or hematemesis; No diarrhea or constipation. No melena or hematochezia.  GENITOURINARY: No dysuria, frequency or hematuria  NEUROLOGICAL: No numbness or weakness  SKIN: No itching, burning, rashes, or lesions   All other review of systems is negative unless indicated above.    INTERVAL HPI/OVERNIGHT EVENTS:  T(C): 36.3 (12-17-23 @ 14:30), Max: 36.3 (12-17-23 @ 11:26)  HR: 66 (12-17-23 @ 14:30) (66 - 72)  BP: 166/73 (12-17-23 @ 14:30) (166/73 - 172/60)  RR: 17 (12-17-23 @ 14:30) (16 - 17)  SpO2: 98% (12-17-23 @ 14:30) (97% - 98%)  Wt(kg): --  I&O's Summary      PHYSICAL EXAM:  GENERAL: NAD, well-groomed, well-developed  HEAD:  Atraumatic, Normocephalic  EYES: EOMI, PERRLA, conjunctiva and sclera clear  ENMT: No tonsillar erythema, exudates, or enlargement; Moist mucous membranes, Good dentition, No lesions  NECK: Supple, No JVD, Normal thyroid  NERVOUS SYSTEM:  Alert & Oriented X3, Good concentration; Motor Strength 5/5 B/L upper and lower extremities; DTRs 2+ intact and symmetric  CHEST/LUNG: Clear to percussion bilaterally; No rales, rhonchi, wheezing, or rubs  HEART: Regular rate and rhythm; No murmurs, rubs, or gallops  ABDOMEN: Soft, Nontender, Nondistended; Bowel sounds present  EXTREMITIES:  2+ Peripheral Pulses, No clubbing, cyanosis, or edema  LYMPH: No lymphadenopathy noted  SKIN: No rashes or lesions        LABS:                        11.1   13.64 )-----------( 257      ( 17 Dec 2023 12:38 )             35.0     12-17    137  |  102  |  26<H>  ----------------------------<  93  4.1   |  23  |  1.01    Ca    9.6      17 Dec 2023 12:38  Mg     1.3     12-17    TPro  6.7  /  Alb  4.1  /  TBili  0.5  /  DBili  x   /  AST  22  /  ALT  19  /  AlkPhos  78  12-17    PT/INR - ( 17 Dec 2023 12:38 )   PT: 10.5 sec;   INR: 0.95 ratio         PTT - ( 17 Dec 2023 12:38 )  PTT:30.3 sec  Urinalysis Basic - ( 17 Dec 2023 12:38 )    Color: x / Appearance: x / SG: x / pH: x  Gluc: 93 mg/dL / Ketone: x  / Bili: x / Urobili: x   Blood: x / Protein: x / Nitrite: x   Leuk Esterase: x / RBC: x / WBC x   Sq Epi: x / Non Sq Epi: x / Bacteria: x      CAPILLARY BLOOD GLUCOSE            Urinalysis Basic - ( 17 Dec 2023 12:38 )    Color: x / Appearance: x / SG: x / pH: x  Gluc: 93 mg/dL / Ketone: x  / Bili: x / Urobili: x   Blood: x / Protein: x / Nitrite: x   Leuk Esterase: x / RBC: x / WBC x   Sq Epi: x / Non Sq Epi: x / Bacteria: x        EKG: NSR @ 69 incomplete LBBB  82 y/o F PmHx DM, HTN and Hypercholesterolemia presents following fall onto R-side. Patient states she slipped while in the kitchen at 9:30AM. Following the fall c/o R hip pain. Denies head strike, LOC, nausea, vomiting. She denies numbness/tingling b/l lower extremity, bowel and bladder incontinence. The patient was unable to bear weight on her R leg following the fall. Denies upper extremity pain, chest pain, SOB. She was brought to [facility] for further evaluation and treatment. In the ED she was found to have a right hip fracture and is scheduled for an Open reduction and internal fixation of the left hip. Patient seen now resting comfortably      PAST MEDICAL & SURGICAL HISTORY:  Diabetes      Hypertension      High cholesterol      H/O abdominal hysterectomy            MEDICATIONS  (STANDING):  aspirin enteric coated 81 milliGRAM(s) Oral daily  atorvastatin 40 milliGRAM(s) Oral at bedtime  phenazopyridine 200 milliGRAM(s) Oral three times a day  sodium chloride 0.9%. 1000 milliLiter(s) (125 mL/Hr) IV Continuous <Continuous>    MEDICATIONS  (PRN):    Social Hx:  Tobacco: neg  ETOH: Occasionally  Drugs:  Neg    Family Hx:  As per my conversation with the patient, non contributory         ROS  CONSTITUTIONAL: No weakness, fevers or chills  EYES/ENT: No visual changes;  No vertigo or throat pain   NECK: No pain or stiffness  RESPIRATORY: No cough, wheezing, hemoptysis; No shortness of breath  CARDIOVASCULAR: No chest pain or palpitations  GASTROINTESTINAL: No abdominal or epigastric pain. No nausea, vomiting, or hematemesis; No diarrhea or constipation. No melena or hematochezia.  GENITOURINARY: No dysuria, frequency or hematuria  NEUROLOGICAL: No numbness or weakness  SKIN: No itching, burning, rashes, or lesions   All other review of systems is negative unless indicated above.    INTERVAL HPI/OVERNIGHT EVENTS:  T(C): 36.3 (12-17-23 @ 14:30), Max: 36.3 (12-17-23 @ 11:26)  HR: 66 (12-17-23 @ 14:30) (66 - 72)  BP: 166/73 (12-17-23 @ 14:30) (166/73 - 172/60)  RR: 17 (12-17-23 @ 14:30) (16 - 17)  SpO2: 98% (12-17-23 @ 14:30) (97% - 98%)  Wt(kg): --  I&O's Summary      PHYSICAL EXAM:  GENERAL: NAD, well-groomed, well-developed  HEAD:  Atraumatic, Normocephalic  EYES: EOMI, PERRLA, conjunctiva and sclera clear  ENMT: No tonsillar erythema, exudates, or enlargement; Moist mucous membranes, Good dentition, No lesions  NECK: Supple, No JVD, Normal thyroid  NERVOUS SYSTEM:  Alert & Oriented X3, Good concentration; Motor Strength 5/5 B/L upper and lower extremities; DTRs 2+ intact and symmetric  CHEST/LUNG: Clear to percussion bilaterally; No rales, rhonchi, wheezing, or rubs  HEART: Regular rate and rhythm; No murmurs, rubs, or gallops  ABDOMEN: Soft, Nontender, Nondistended; Bowel sounds present  EXTREMITIES:  2+ Peripheral Pulses, No clubbing, cyanosis, or edema  LYMPH: No lymphadenopathy noted  SKIN: No rashes or lesions        LABS:                        11.1   13.64 )-----------( 257      ( 17 Dec 2023 12:38 )             35.0     12-17    137  |  102  |  26<H>  ----------------------------<  93  4.1   |  23  |  1.01    Ca    9.6      17 Dec 2023 12:38  Mg     1.3     12-17    TPro  6.7  /  Alb  4.1  /  TBili  0.5  /  DBili  x   /  AST  22  /  ALT  19  /  AlkPhos  78  12-17    PT/INR - ( 17 Dec 2023 12:38 )   PT: 10.5 sec;   INR: 0.95 ratio         PTT - ( 17 Dec 2023 12:38 )  PTT:30.3 sec  Urinalysis Basic - ( 17 Dec 2023 12:38 )    Color: x / Appearance: x / SG: x / pH: x  Gluc: 93 mg/dL / Ketone: x  / Bili: x / Urobili: x   Blood: x / Protein: x / Nitrite: x   Leuk Esterase: x / RBC: x / WBC x   Sq Epi: x / Non Sq Epi: x / Bacteria: x      CAPILLARY BLOOD GLUCOSE            Urinalysis Basic - ( 17 Dec 2023 12:38 )    Color: x / Appearance: x / SG: x / pH: x  Gluc: 93 mg/dL / Ketone: x  / Bili: x / Urobili: x   Blood: x / Protein: x / Nitrite: x   Leuk Esterase: x / RBC: x / WBC x   Sq Epi: x / Non Sq Epi: x / Bacteria: x        EKG: NSR @ 69 incomplete LBBB  82 y/o F PmHx DM, HTN and Hypercholesterolemia presents following fall onto R-side. Patient states she slipped while in the kitchen at 9:30AM. Following the fall c/o R hip pain. Denies head strike, LOC, nausea, vomiting. She denies numbness/tingling b/l lower extremity, bowel and bladder incontinence. The patient was unable to bear weight on her R leg following the fall. Denies upper extremity pain, chest pain, SOB. She was brought to [facility] for further evaluation and treatment. In the ED she was found to have a right hip fracture and is scheduled for an Open reduction and internal fixation of the left hip. Patient seen now resting comfortably      PAST MEDICAL & SURGICAL HISTORY:  Diabetes      Hypertension      High cholesterol      H/O abdominal hysterectomy            MEDICATIONS  (STANDING):  aspirin enteric coated 81 milliGRAM(s) Oral daily  atorvastatin 40 milliGRAM(s) Oral at bedtime  phenazopyridine 200 milliGRAM(s) Oral three times a day  sodium chloride 0.9%. 1000 milliLiter(s) (125 mL/Hr) IV Continuous <Continuous>    MEDICATIONS  (PRN):    Social Hx:  Tobacco: neg  ETOH: Occasionally  Drugs:  Neg    Family Hx:  As per my conversation with the patient, non contributory         ROS  CONSTITUTIONAL: No weakness, fevers or chills  EYES/ENT: No visual changes;  No vertigo or throat pain   NECK: No pain or stiffness  RESPIRATORY: No cough, wheezing, hemoptysis; No shortness of breath  CARDIOVASCULAR: No chest pain or palpitations  GASTROINTESTINAL: No abdominal or epigastric pain. No nausea, vomiting, or hematemesis; No diarrhea or constipation. No melena or hematochezia.  GENITOURINARY: No dysuria, frequency or hematuria  NEUROLOGICAL: No numbness or weakness  SKIN: No itching, burning, rashes, or lesions   MUSCULISKELETAL: right leg pain    INTERVAL HPI/OVERNIGHT EVENTS:  T(C): 36.3 (12-17-23 @ 14:30), Max: 36.3 (12-17-23 @ 11:26)  HR: 66 (12-17-23 @ 14:30) (66 - 72)  BP: 166/73 (12-17-23 @ 14:30) (166/73 - 172/60)  RR: 17 (12-17-23 @ 14:30) (16 - 17)  SpO2: 98% (12-17-23 @ 14:30) (97% - 98%)  Wt(kg): --  I&O's Summary      PHYSICAL EXAM:  GENERAL: NAD, well-groomed, well-developed  HEAD:  Atraumatic, Normocephalic  EYES: EOMI, PERRLA, conjunctiva and sclera clear  ENMT: No tonsillar erythema, exudates, or enlargement; Moist mucous membranes, Good dentition, No lesions  NECK: Supple, No JVD, Normal thyroid  NERVOUS SYSTEM:  Alert & Oriented X3, Good concentration; Motor Strength 5/5 B/L upper and lower extremities; DTRs 2+ intact and symmetric  CHEST/LUNG: Clear to percussion bilaterally; No rales, rhonchi, wheezing, or rubs  HEART: Regular rate and rhythm; No murmurs, rubs, or gallops  ABDOMEN: Soft, Nontender, Nondistended; Bowel sounds present  EXTREMITIES:  2+ Peripheral Pulses, No clubbing, cyanosis, or edema  LYMPH: No lymphadenopathy noted  SKIN: No rashes or lesions        LABS:                        11.1   13.64 )-----------( 257      ( 17 Dec 2023 12:38 )             35.0     12-17    137  |  102  |  26<H>  ----------------------------<  93  4.1   |  23  |  1.01    Ca    9.6      17 Dec 2023 12:38  Mg     1.3     12-17    TPro  6.7  /  Alb  4.1  /  TBili  0.5  /  DBili  x   /  AST  22  /  ALT  19  /  AlkPhos  78  12-17    PT/INR - ( 17 Dec 2023 12:38 )   PT: 10.5 sec;   INR: 0.95 ratio         PTT - ( 17 Dec 2023 12:38 )  PTT:30.3 sec  Urinalysis Basic - ( 17 Dec 2023 12:38 )    Color: x / Appearance: x / SG: x / pH: x  Gluc: 93 mg/dL / Ketone: x  / Bili: x / Urobili: x   Blood: x / Protein: x / Nitrite: x   Leuk Esterase: x / RBC: x / WBC x   Sq Epi: x / Non Sq Epi: x / Bacteria: x      CAPILLARY BLOOD GLUCOSE            Urinalysis Basic - ( 17 Dec 2023 12:38 )    Color: x / Appearance: x / SG: x / pH: x  Gluc: 93 mg/dL / Ketone: x  / Bili: x / Urobili: x   Blood: x / Protein: x / Nitrite: x   Leuk Esterase: x / RBC: x / WBC x   Sq Epi: x / Non Sq Epi: x / Bacteria: x        EKG: NSR @ 69 incomplete LBBB  82 y/o F PmHx DM, HTN and Hypercholesterolemia presents following fall onto R-side. Patient states she slipped while in the kitchen at 9:30AM. Following the fall c/o R hip pain. Denies head strike, LOC, nausea, vomiting. She denies numbness/tingling b/l lower extremity, bowel and bladder incontinence. The patient was unable to bear weight on her R leg following the fall. Denies upper extremity pain, chest pain, SOB. She was brought to [facility] for further evaluation and treatment. In the ED she was found to have a right hip fracture and is scheduled for an Open reduction and internal fixation of the left hip. Patient seen now resting comfortably      PAST MEDICAL & SURGICAL HISTORY:  Diabetes      Hypertension      High cholesterol      H/O abdominal hysterectomy            MEDICATIONS  (STANDING):  aspirin enteric coated 81 milliGRAM(s) Oral daily  atorvastatin 40 milliGRAM(s) Oral at bedtime  phenazopyridine 200 milliGRAM(s) Oral three times a day  sodium chloride 0.9%. 1000 milliLiter(s) (125 mL/Hr) IV Continuous <Continuous>    MEDICATIONS  (PRN):    Social Hx:  Tobacco: neg  ETOH: Occasionally  Drugs:  Neg    Family Hx:  As per my conversation with the patient, non contributory         ROS  CONSTITUTIONAL: No weakness, fevers or chills  EYES/ENT: No visual changes;  No vertigo or throat pain   NECK: No pain or stiffness  RESPIRATORY: No cough, wheezing, hemoptysis; No shortness of breath  CARDIOVASCULAR: No chest pain or palpitations  GASTROINTESTINAL: No abdominal or epigastric pain. No nausea, vomiting, or hematemesis; No diarrhea or constipation. No melena or hematochezia.  GENITOURINARY: No dysuria, frequency or hematuria  NEUROLOGICAL: No numbness or weakness  SKIN: No itching, burning, rashes, or lesions   MUSCULISKELETAL: right leg pain    INTERVAL HPI/OVERNIGHT EVENTS:  T(C): 36.3 (12-17-23 @ 14:30), Max: 36.3 (12-17-23 @ 11:26)  HR: 66 (12-17-23 @ 14:30) (66 - 72)  BP: 166/73 (12-17-23 @ 14:30) (166/73 - 172/60)  RR: 17 (12-17-23 @ 14:30) (16 - 17)  SpO2: 98% (12-17-23 @ 14:30) (97% - 98%)  Wt(kg): --  I&O's Summary      PHYSICAL EXAM:  GENERAL: NAD, well-groomed, well-developed  HEAD:  Atraumatic, Normocephalic  EYES: EOMI, PERRLA, conjunctiva and sclera clear  ENMT: No tonsillar erythema, exudates, or enlargement; Moist mucous membranes, Good dentition, No lesions  NECK: Supple, No JVD, Normal thyroid  NERVOUS SYSTEM:  Alert & Oriented X3, Good concentration; Motor Strength 5/5 B/L upper and lower extremities; DTRs 2+ intact and symmetric  CHEST/LUNG: Clear to percussion bilaterally; No rales, rhonchi, wheezing, or rubs  HEART: Regular rate and rhythm; No murmurs, rubs, or gallops  ABDOMEN: Soft, Nontender, Nondistended; Bowel sounds present  EXTREMITIES:  2+ Peripheral Pulses, No clubbing, cyanosis, or edema  LYMPH: No lymphadenopathy noted  SKIN: No rashes or lesions        LABS:                        11.1   13.64 )-----------( 257      ( 17 Dec 2023 12:38 )             35.0     12-17    137  |  102  |  26<H>  ----------------------------<  93  4.1   |  23  |  1.01    Ca    9.6      17 Dec 2023 12:38  Mg     1.3     12-17    TPro  6.7  /  Alb  4.1  /  TBili  0.5  /  DBili  x   /  AST  22  /  ALT  19  /  AlkPhos  78  12-17    PT/INR - ( 17 Dec 2023 12:38 )   PT: 10.5 sec;   INR: 0.95 ratio         PTT - ( 17 Dec 2023 12:38 )  PTT:30.3 sec  Urinalysis Basic - ( 17 Dec 2023 12:38 )    Color: x / Appearance: x / SG: x / pH: x  Gluc: 93 mg/dL / Ketone: x  / Bili: x / Urobili: x   Blood: x / Protein: x / Nitrite: x   Leuk Esterase: x / RBC: x / WBC x   Sq Epi: x / Non Sq Epi: x / Bacteria: x      CAPILLARY BLOOD GLUCOSE            Urinalysis Basic - ( 17 Dec 2023 12:38 )    Color: x / Appearance: x / SG: x / pH: x  Gluc: 93 mg/dL / Ketone: x  / Bili: x / Urobili: x   Blood: x / Protein: x / Nitrite: x   Leuk Esterase: x / RBC: x / WBC x   Sq Epi: x / Non Sq Epi: x / Bacteria: x        EKG: NSR @ 69 incomplete LBBB  84 y/o F PmHx DM, HTN and Hypercholesterolemia presents following fall onto R-side. Patient states she slipped while in the kitchen at 9:30AM. Following the fall c/o R hip pain. Denies head strike, LOC, nausea, vomiting. She denies numbness/tingling b/l lower extremity, bowel and bladder incontinence. The patient was unable to bear weight on her R leg following the fall. Denies upper extremity pain, chest pain, SOB. She was brought to [facility] for further evaluation and treatment. In the ED she was found to have a right hip fracture and is scheduled for an Open reduction and internal fixation of the right hip. Patient seen now resting comfortably      PAST MEDICAL & SURGICAL HISTORY:  Diabetes      Hypertension      High cholesterol      H/O abdominal hysterectomy            MEDICATIONS  (STANDING):  aspirin enteric coated 81 milliGRAM(s) Oral daily  atorvastatin 40 milliGRAM(s) Oral at bedtime  phenazopyridine 200 milliGRAM(s) Oral three times a day  sodium chloride 0.9%. 1000 milliLiter(s) (125 mL/Hr) IV Continuous <Continuous>    MEDICATIONS  (PRN):    Social Hx:  Tobacco: neg  ETOH: Occasionally  Drugs:  Neg    Family Hx:  As per my conversation with the patient, non contributory         ROS  CONSTITUTIONAL: No weakness, fevers or chills  EYES/ENT: No visual changes;  No vertigo or throat pain   NECK: No pain or stiffness  RESPIRATORY: No cough, wheezing, hemoptysis; No shortness of breath  CARDIOVASCULAR: No chest pain or palpitations  GASTROINTESTINAL: No abdominal or epigastric pain. No nausea, vomiting, or hematemesis; No diarrhea or constipation. No melena or hematochezia.  GENITOURINARY: No dysuria, frequency or hematuria  NEUROLOGICAL: No numbness or weakness  SKIN: No itching, burning, rashes, or lesions   MUSCULISKELETAL: right leg pain    INTERVAL HPI/OVERNIGHT EVENTS:  T(C): 36.3 (12-17-23 @ 14:30), Max: 36.3 (12-17-23 @ 11:26)  HR: 66 (12-17-23 @ 14:30) (66 - 72)  BP: 166/73 (12-17-23 @ 14:30) (166/73 - 172/60)  RR: 17 (12-17-23 @ 14:30) (16 - 17)  SpO2: 98% (12-17-23 @ 14:30) (97% - 98%)  Wt(kg): --  I&O's Summary      PHYSICAL EXAM:  GENERAL: NAD, well-groomed, well-developed  HEAD:  Atraumatic, Normocephalic  EYES: EOMI, PERRLA, conjunctiva and sclera clear  ENMT: No tonsillar erythema, exudates, or enlargement; Moist mucous membranes, Good dentition, No lesions  NECK: Supple, No JVD, Normal thyroid  NERVOUS SYSTEM:  Alert & Oriented X3, Good concentration; Motor Strength 5/5 B/L upper and lower extremities; DTRs 2+ intact and symmetric  CHEST/LUNG: Clear to percussion bilaterally; No rales, rhonchi, wheezing, or rubs  HEART: Regular rate and rhythm; No murmurs, rubs, or gallops  ABDOMEN: Soft, Nontender, Nondistended; Bowel sounds present  EXTREMITIES:  2+ Peripheral Pulses, No clubbing, cyanosis, or edema  LYMPH: No lymphadenopathy noted  SKIN: No rashes or lesions        LABS:                        11.1   13.64 )-----------( 257      ( 17 Dec 2023 12:38 )             35.0     12-17    137  |  102  |  26<H>  ----------------------------<  93  4.1   |  23  |  1.01    Ca    9.6      17 Dec 2023 12:38  Mg     1.3     12-17    TPro  6.7  /  Alb  4.1  /  TBili  0.5  /  DBili  x   /  AST  22  /  ALT  19  /  AlkPhos  78  12-17    PT/INR - ( 17 Dec 2023 12:38 )   PT: 10.5 sec;   INR: 0.95 ratio         PTT - ( 17 Dec 2023 12:38 )  PTT:30.3 sec  Urinalysis Basic - ( 17 Dec 2023 12:38 )    Color: x / Appearance: x / SG: x / pH: x  Gluc: 93 mg/dL / Ketone: x  / Bili: x / Urobili: x   Blood: x / Protein: x / Nitrite: x   Leuk Esterase: x / RBC: x / WBC x   Sq Epi: x / Non Sq Epi: x / Bacteria: x      CAPILLARY BLOOD GLUCOSE            Urinalysis Basic - ( 17 Dec 2023 12:38 )    Color: x / Appearance: x / SG: x / pH: x  Gluc: 93 mg/dL / Ketone: x  / Bili: x / Urobili: x   Blood: x / Protein: x / Nitrite: x   Leuk Esterase: x / RBC: x / WBC x   Sq Epi: x / Non Sq Epi: x / Bacteria: x        EKG: NSR @ 69 incomplete LBBB  84 y/o F PmHx DM, HTN and Hypercholesterolemia presents following fall onto R-side. Patient states she slipped while in the kitchen at 9:30AM. Following the fall c/o R hip pain. Denies head strike, LOC, nausea, vomiting. She denies numbness/tingling b/l lower extremity, bowel and bladder incontinence. The patient was unable to bear weight on her R leg following the fall. Denies upper extremity pain, chest pain, SOB. She was brought to [facility] for further evaluation and treatment. In the ED she was found to have a right hip fracture and is scheduled for an Open reduction and internal fixation of the right hip. Patient seen now resting comfortably      PAST MEDICAL & SURGICAL HISTORY:  Diabetes      Hypertension      High cholesterol      H/O abdominal hysterectomy            MEDICATIONS  (STANDING):  aspirin enteric coated 81 milliGRAM(s) Oral daily  atorvastatin 40 milliGRAM(s) Oral at bedtime  phenazopyridine 200 milliGRAM(s) Oral three times a day  sodium chloride 0.9%. 1000 milliLiter(s) (125 mL/Hr) IV Continuous <Continuous>    MEDICATIONS  (PRN):    Social Hx:  Tobacco: neg  ETOH: Occasionally  Drugs:  Neg    Family Hx:  As per my conversation with the patient, non contributory         ROS  CONSTITUTIONAL: No weakness, fevers or chills  EYES/ENT: No visual changes;  No vertigo or throat pain   NECK: No pain or stiffness  RESPIRATORY: No cough, wheezing, hemoptysis; No shortness of breath  CARDIOVASCULAR: No chest pain or palpitations  GASTROINTESTINAL: No abdominal or epigastric pain. No nausea, vomiting, or hematemesis; No diarrhea or constipation. No melena or hematochezia.  GENITOURINARY: No dysuria, frequency or hematuria  NEUROLOGICAL: No numbness or weakness  SKIN: No itching, burning, rashes, or lesions   MUSCULISKELETAL: right leg pain    INTERVAL HPI/OVERNIGHT EVENTS:  T(C): 36.3 (12-17-23 @ 14:30), Max: 36.3 (12-17-23 @ 11:26)  HR: 66 (12-17-23 @ 14:30) (66 - 72)  BP: 166/73 (12-17-23 @ 14:30) (166/73 - 172/60)  RR: 17 (12-17-23 @ 14:30) (16 - 17)  SpO2: 98% (12-17-23 @ 14:30) (97% - 98%)  Wt(kg): --  I&O's Summary      PHYSICAL EXAM:  GENERAL: NAD, well-groomed, well-developed  HEAD:  Atraumatic, Normocephalic  EYES: EOMI, PERRLA, conjunctiva and sclera clear  ENMT: No tonsillar erythema, exudates, or enlargement; Moist mucous membranes, Good dentition, No lesions  NECK: Supple, No JVD, Normal thyroid  NERVOUS SYSTEM:  Alert & Oriented X3, Good concentration; Motor Strength 5/5 B/L upper and lower extremities; DTRs 2+ intact and symmetric  CHEST/LUNG: Clear to percussion bilaterally; No rales, rhonchi, wheezing, or rubs  HEART: Regular rate and rhythm; No murmurs, rubs, or gallops  ABDOMEN: Soft, Nontender, Nondistended; Bowel sounds present  EXTREMITIES:  2+ Peripheral Pulses, No clubbing, cyanosis, or edema  LYMPH: No lymphadenopathy noted  SKIN: No rashes or lesions        LABS:                        11.1   13.64 )-----------( 257      ( 17 Dec 2023 12:38 )             35.0     12-17    137  |  102  |  26<H>  ----------------------------<  93  4.1   |  23  |  1.01    Ca    9.6      17 Dec 2023 12:38  Mg     1.3     12-17    TPro  6.7  /  Alb  4.1  /  TBili  0.5  /  DBili  x   /  AST  22  /  ALT  19  /  AlkPhos  78  12-17    PT/INR - ( 17 Dec 2023 12:38 )   PT: 10.5 sec;   INR: 0.95 ratio         PTT - ( 17 Dec 2023 12:38 )  PTT:30.3 sec  Urinalysis Basic - ( 17 Dec 2023 12:38 )    Color: x / Appearance: x / SG: x / pH: x  Gluc: 93 mg/dL / Ketone: x  / Bili: x / Urobili: x   Blood: x / Protein: x / Nitrite: x   Leuk Esterase: x / RBC: x / WBC x   Sq Epi: x / Non Sq Epi: x / Bacteria: x      CAPILLARY BLOOD GLUCOSE            Urinalysis Basic - ( 17 Dec 2023 12:38 )    Color: x / Appearance: x / SG: x / pH: x  Gluc: 93 mg/dL / Ketone: x  / Bili: x / Urobili: x   Blood: x / Protein: x / Nitrite: x   Leuk Esterase: x / RBC: x / WBC x   Sq Epi: x / Non Sq Epi: x / Bacteria: x        EKG: NSR @ 69 incomplete LBBB

## 2023-12-17 NOTE — PRE-ANESTHESIA EVALUATION ADULT - NSANTHDISPORD_GEN_ALL_CORE
History  Chief Complaint   Patient presents with   • Abdominal Pain     Pt presents to the ed with right lower abdominal that started about 1 week ago, denies N/V, no otc medications        History provided by:  Patient   used: No    Abdominal Pain  Pain location:  RLQ and periumbilical  Pain quality: sharp and stabbing    Pain radiates to:  Does not radiate  Pain severity:  Moderate  Onset quality:  Gradual  Duration:  2 weeks  Timing:  Constant  Progression:  Unchanged  Chronicity:  New  Context: not eating, not recent travel, not retching, not sick contacts and not suspicious food intake    Relieved by:  Nothing  Worsened by:  Nothing  Ineffective treatments:  None tried  Associated symptoms: no diarrhea, no fever, no shortness of breath, no sore throat and no vaginal bleeding        None       History reviewed  No pertinent past medical history  History reviewed  No pertinent surgical history  History reviewed  No pertinent family history  I have reviewed and agree with the history as documented  E-Cigarette/Vaping     E-Cigarette/Vaping Substances     Social History     Tobacco Use   • Smoking status: Never   • Smokeless tobacco: Never       Review of Systems   Constitutional: Negative for fever  HENT: Negative for sore throat  Respiratory: Negative for shortness of breath  Gastrointestinal: Positive for abdominal pain  Negative for diarrhea  Genitourinary: Negative for vaginal bleeding  All other systems reviewed and are negative  Physical Exam  Physical Exam  Vitals and nursing note reviewed  Constitutional:       General: He is not in acute distress  Appearance: He is well-developed  HENT:      Head: Normocephalic and atraumatic  Eyes:      Conjunctiva/sclera: Conjunctivae normal    Cardiovascular:      Rate and Rhythm: Normal rate and regular rhythm  Heart sounds: No murmur heard    Pulmonary:      Effort: Pulmonary effort is normal  No respiratory distress  Breath sounds: Normal breath sounds  Abdominal:      Palpations: Abdomen is soft  Tenderness: There is abdominal tenderness in the right lower quadrant, periumbilical area and suprapubic area  There is no guarding or rebound  Musculoskeletal:         General: No swelling  Cervical back: Neck supple  Skin:     General: Skin is warm and dry  Capillary Refill: Capillary refill takes less than 2 seconds  Neurological:      Mental Status: He is alert     Psychiatric:         Mood and Affect: Mood normal          Vital Signs  ED Triage Vitals [01/04/23 2318]   Temperature Pulse Respirations Blood Pressure SpO2   97 6 °F (36 4 °C) 82 18 131/74 100 %      Temp Source Heart Rate Source Patient Position - Orthostatic VS BP Location FiO2 (%)   Oral Monitor Sitting Left arm --      Pain Score       5           Vitals:    01/04/23 2318   BP: 131/74   Pulse: 82   Patient Position - Orthostatic VS: Sitting         Visual Acuity      ED Medications  Medications - No data to display    Diagnostic Studies  Results Reviewed     Procedure Component Value Units Date/Time    Comprehensive metabolic panel [[id number]]  (Abnormal) Collected: 01/04/23 2328    Lab Status: Final result Specimen: Blood from Arm, Right Updated: 01/04/23 2353     Sodium 135 mmol/L      Potassium 4 3 mmol/L      Chloride 100 mmol/L      CO2 27 mmol/L      ANION GAP 8 mmol/L      BUN 15 mg/dL      Creatinine 0 76 mg/dL      Glucose 102 mg/dL      Calcium 8 8 mg/dL      AST 58 U/L      ALT 52 U/L      Alkaline Phosphatase 496 U/L      Total Protein 7 9 g/dL      Albumin 3 5 g/dL      Total Bilirubin 0 33 mg/dL      eGFR 114 ml/min/1 73sq m     Narrative:      [place] guidelines for Chronic Kidney Disease (CKD):   •  Stage 1 with normal or high GFR (GFR > 90 mL/min/1 73 square meters)  •  Stage 2 Mild CKD (GFR = 60-89 mL/min/1 73 square meters)  •  Stage 3A Moderate CKD (GFR = 45-59 mL/min/1 73 square meters)  •  Stage 3B Moderate CKD (GFR = 30-44 mL/min/1 73 square meters)  •  Stage 4 Severe CKD (GFR = 15-29 mL/min/1 73 square meters)  •  Stage 5 End Stage CKD (GFR <15 mL/min/1 73 square meters)  Note: GFR calculation is accurate only with a steady state creatinine    Lipase [[id number]]  (Normal) Collected: 01/04/23 2328    Lab Status: Final result Specimen: Blood from Arm, Right Updated: 01/04/23 2353     Lipase 34 u/L     UA w Reflex to Microscopic w Reflex to Culture [[id number]]  (Normal) Collected: 01/04/23 2334    Lab Status: Final result Specimen: Urine, Other Updated: 01/04/23 2339     Color, UA Yellow     Clarity, UA Clear     Specific [place], UA 1 020     pH, UA 6 0     Leukocytes, UA Negative     Nitrite, UA Negative     Protein, UA Negative mg/dl      Glucose, UA Negative mg/dl      Ketones, UA Negative mg/dl      Urobilinogen, UA 1 0 E U /dl      Bilirubin, UA Negative     Occult Blood, UA Negative    CBC and differential [[id number]]  (Abnormal) Collected: 01/04/23 2328    Lab Status: Final result Specimen: Blood from Arm, Right Updated: 01/04/23 2338     WBC 9 91 Thousand/uL      RBC 3 81 Million/uL      Hemoglobin 9 0 g/dL      Hematocrit 28 0 %      MCV 74 fL      MCH 23 6 pg      MCHC 32 1 g/dL      RDW 14 1 %      MPV 9 2 fL      Platelets 445 Thousands/uL      nRBC 0 /100 WBCs      Neutrophils Relative 66 %      Immat GRANS % 0 %      Lymphocytes Relative 22 %      Monocytes Relative 9 %      Eosinophils Relative 2 %      Basophils Relative 1 %      Neutrophils Absolute 6 51 Thousands/µL      Immature Grans Absolute 0 02 Thousand/uL      Lymphocytes Absolute 2 17 Thousands/µL      Monocytes Absolute 0 93 Thousand/µL      Eosinophils Absolute 0 23 Thousand/µL      Basophils Absolute 0 05 Thousands/µL                  CT abdomen pelvis with contrast    (Results Pending)              Procedures  Procedures         ED Course                               SBIRT 20yo+    Flowsheet Row Most Recent Value   SBIRT (25 yo +)    In order to provide better care to our patients, we are screening all of our patients for alcohol and drug use  Would it be okay to ask you these screening questions? 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  He has no prior history of gastric cancer so this is concerning at this time  Patient is also anemic likely due to a slow gastric bleed  Hemoccult sent to the lab for evaluation  He has no PCP or any other ability to follow-up at this time  Plan is to likely transfer him to inpatient hospital stay where he can be seen by heme-onc and GI to begin the therapeutic process  Case endorsed to oncoming ED physician pending disposition  Anemia: acute illness or injury  Gastric mass: acute illness or injury  Liver metastasis ([facility] [address] ): acute illness or injury  Amount and/or Complexity of Data Reviewed  Labs: ordered  Radiology: ordered  Risk  Prescription drug management  Disposition  Final diagnoses:   None     ED Disposition     None      Follow-up Information    None         Patient's Medications    No medications on file       No discharge procedures on file      PDMP Review     None          ED Provider  Electronically Signed by           Ba Portillo MD  01/05/23 1221 PACU

## 2023-12-17 NOTE — PROGRESS NOTE ADULT - SUBJECTIVE AND OBJECTIVE BOX
This is a 84y/o Female s/p R hip IMN  Pain is controlled. Pt feeling well. No nausea or vomiting.    LABS:                        9.7    14.87 )-----------( 240      ( 17 Dec 2023 18:55 )             30.0     12-17    137  |  103  |  25<H>  ----------------------------<  78  4.0   |  23  |  0.97    Ca    9.3      17 Dec 2023 18:55  Mg     1.3     12-17    TPro  6.7  /  Alb  4.1  /  TBili  0.5  /  DBili  x   /  AST  22  /  ALT  19  /  AlkPhos  78  12-17    PT/INR - ( 17 Dec 2023 12:38 )   PT: 10.5 sec;   INR: 0.95 ratio         PTT - ( 17 Dec 2023 12:38 )  PTT:30.3 sec  Urinalysis Basic - ( 17 Dec 2023 18:55 )    Color: x / Appearance: x / SG: x / pH: x  Gluc: 78 mg/dL / Ketone: x  / Bili: x / Urobili: x   Blood: x / Protein: x / Nitrite: x   Leuk Esterase: x / RBC: x / WBC x   Sq Epi: x / Non Sq Epi: x / Bacteria: x        VITAL SIGNS:  T(C): 36.4 (12-17-23 @ 22:45), Max: 36.4 (12-17-23 @ 18:15)  HR: 79 (12-17-23 @ 22:45) (66 - 87)  BP: 119/66 (12-17-23 @ 22:45) (115/56 - 172/60)  RR: 18 (12-17-23 @ 22:45) (15 - 18)  SpO2: 99% (12-17-23 @ 22:45) (97% - 100%)    Exam:  NAD AAOx3.  Dressing clean, dry and intact.  SCDs in place.  Calves are soft and nontender.  Moving all toes and ankle, +EHL/FHL/TA/GS.  SILT throughout.  DP pulses +.    A/P:  Stable POD 0 s/p R hip IMN  -Analgesia  -Ice application  -Prophylactic antibiotics  -DVT PE prophylaxis, Lovenox  -Incentive spirometry  -OOB PT WBAT  -Dispo pending PT       This is a 82y/o Female s/p R hip IMN  Pain is controlled. Pt feeling well. No nausea or vomiting.    LABS:                        9.7    14.87 )-----------( 240      ( 17 Dec 2023 18:55 )             30.0     12-17    137  |  103  |  25<H>  ----------------------------<  78  4.0   |  23  |  0.97    Ca    9.3      17 Dec 2023 18:55  Mg     1.3     12-17    TPro  6.7  /  Alb  4.1  /  TBili  0.5  /  DBili  x   /  AST  22  /  ALT  19  /  AlkPhos  78  12-17    PT/INR - ( 17 Dec 2023 12:38 )   PT: 10.5 sec;   INR: 0.95 ratio         PTT - ( 17 Dec 2023 12:38 )  PTT:30.3 sec  Urinalysis Basic - ( 17 Dec 2023 18:55 )    Color: x / Appearance: x / SG: x / pH: x  Gluc: 78 mg/dL / Ketone: x  / Bili: x / Urobili: x   Blood: x / Protein: x / Nitrite: x   Leuk Esterase: x / RBC: x / WBC x   Sq Epi: x / Non Sq Epi: x / Bacteria: x        VITAL SIGNS:  T(C): 36.4 (12-17-23 @ 22:45), Max: 36.4 (12-17-23 @ 18:15)  HR: 79 (12-17-23 @ 22:45) (66 - 87)  BP: 119/66 (12-17-23 @ 22:45) (115/56 - 172/60)  RR: 18 (12-17-23 @ 22:45) (15 - 18)  SpO2: 99% (12-17-23 @ 22:45) (97% - 100%)    Exam:  NAD AAOx3.  Dressing clean, dry and intact.  SCDs in place.  Calves are soft and nontender.  Moving all toes and ankle, +EHL/FHL/TA/GS.  SILT throughout.  DP pulses +.    A/P:  Stable POD 0 s/p R hip IMN  -Analgesia  -Ice application  -Prophylactic antibiotics  -DVT PE prophylaxis, Lovenox  -Incentive spirometry  -OOB PT WBAT  -Dispo pending PT

## 2023-12-17 NOTE — ED PROVIDER NOTE - OBJECTIVE STATEMENT
84 y/o F PmHx DM, HTN and Hypercholesterolemia presents following fall onto R-side. Patient states she slipped while in the kitchen at 9:30AM. Following the fall c/o R hip pain. Denies head strike, LOC, nausea, vomiting. She denies numbness/tingling b/l lower extremity, bowel and bladder incontinence. The patient was unable to bear weight on her R leg following the fall. Denies upper extremity pain, chest pain, SOB. 82 y/o F PmHx DM, HTN and Hypercholesterolemia presents following fall onto R-side. Patient states she slipped while in the kitchen at 9:30AM. Following the fall c/o R hip pain. Denies head strike, LOC, nausea, vomiting. She denies numbness/tingling b/l lower extremity, bowel and bladder incontinence. The patient was unable to bear weight on her R leg following the fall. Denies upper extremity pain, chest pain, SOB.

## 2023-12-17 NOTE — PATIENT PROFILE ADULT - DEAF OR HARD OF HEARING?
No cholesterol, but recommend repeating CMP for potassium, and liver tests. Order signed, please notify, thanks Rashawn    no

## 2023-12-17 NOTE — ED PROVIDER NOTE - ATTENDING CONTRIBUTION TO CARE
------------ATTENDING NOTE------------  pt w/  brought to ED by EMS, c/o mechanical fall this AM, landed on R hip, c/o severe pain / swelling / decreased ROM to R hip, no head injury / LOC / AMS, no chest pain / sob, nml neuro exam, CTL spine clinically clear, stable nontender chest w/o resp distress, nml cardiac exam, equal distal pulses, soft benign abd, R hip tender w/ soft compartments and nvi w/ bcr distally, imaging w/ R intertrochanteric femur fx, admitting Ortho for ORIF, continued arellano / tx/ optimize medical mgmt,  - Mauri Miles MD   ---------------------------------------------

## 2023-12-17 NOTE — H&P ADULT - NSCORESITESY/N_GEN_A_CORE_RD
"No chief complaint on file.      Initial BP 94/60 (BP Location: Right arm, Patient Position: Chair, Cuff Size: Adult Regular)  Pulse 63  Temp 97.9  F (36.6  C) (Oral)  Resp 16  Wt 141 lb (64 kg)  SpO2 96%  Breastfeeding? Yes  BMI 24.59 kg/m2 Estimated body mass index is 24.59 kg/(m^2) as calculated from the following:    Height as of 12/2/16: 5' 3.5\" (1.613 m).    Weight as of this encounter: 141 lb (64 kg).  Medication Reconciliation: complete   Pricila Groves MA      "
No

## 2023-12-17 NOTE — PATIENT PROFILE ADULT - FALL HARM RISK - RISK INTERVENTIONS
Assistance OOB with selected safe patient handling equipment/Assistance with ambulation/Communicate Fall Risk and Risk Factors to all staff, patient, and family/Reinforce activity limits and safety measures with patient and family/Visual Cue: Yellow wristband/Bed in lowest position, wheels locked, appropriate side rails in place/Call bell, personal items and telephone in reach/Instruct patient to call for assistance before getting out of bed or chair/Non-slip footwear when patient is out of bed/[place] to call system/Physically safe environment - no spills, clutter or unnecessary equipment/Purposeful Proactive Rounding/Room/bathroom lighting operational, light cord in reach Assistance OOB with selected safe patient handling equipment/Assistance with ambulation/Communicate Fall Risk and Risk Factors to all staff, patient, and family/Reinforce activity limits and safety measures with patient and family/Visual Cue: Yellow wristband/Bed in lowest position, wheels locked, appropriate side rails in place/Call bell, personal items and telephone in reach/Instruct patient to call for assistance before getting out of bed or chair/Non-slip footwear when patient is out of bed/[place] to call system/Physically safe environment - no spills, clutter or unnecessary equipment/Purposeful Proactive Rounding/Room/bathroom lighting operational, light cord in reach

## 2023-12-17 NOTE — ED PROVIDER NOTE - PHYSICAL EXAMINATION
Physical Exam:  Gen: NAD, AOx3, non-toxic appearing, able to ambulate without assistance  Head: NCAT  HEENT: EOMI, PEERLA, normal conjunctiva, tongue midline, oral mucosa moist  Lung: CTAB, no respiratory distress, no wheezes/rhonchi/rales B/L, speaking in full sentences  CV: RRR, no murmurs, rubs or gallops  Abd: soft, NT, ND, no guarding, no rigidity, no rebound tenderness, no CVA tenderness   MSK: R leg internally rotated and shortened, tenderness over femoral head, no tenderness over midshaft of femur, knee, tibia/fibula, lower extremities soft to palpation  Vascular: DP and PT pulses 2+ B/L   Neuro: No focal sensory or motor deficits  Skin: Warm, well perfused, no rash, no leg swelling  Psych: normal affect, calm Physical Exam:  Gen: NAD, AOx3, non-toxic appearing  Head: NCAT  HEENT: EOMI, PEERLA, normal conjunctiva, tongue midline, oral mucosa moist  Lung: CTAB, no respiratory distress, no wheezes/rhonchi/rales B/L, speaking in full sentences  CV: RRR, no murmurs, rubs or gallops  Abd: soft, NT, ND, no guarding, no rigidity, no rebound tenderness, no CVA tenderness   MSK: R leg internally rotated and shortened, tenderness over femoral head, no tenderness over midshaft of femur, knee, tibia/fibula, lower extremities soft to palpation  Vascular: DP and PT pulses 2+ B/L   Neuro: No focal sensory or motor deficits  Skin: Warm, well perfused, no rash, no leg swelling  Psych: normal affect, calm

## 2023-12-17 NOTE — CONSULT NOTE ADULT - TIME BILLING
Discussed treatment plan with patient at bedside. I am a non participating [facility] physician seeing Pt in coverage for Dr Barragan Discussed treatment plan with patient at bedside. I am a non participating [facility] physician seeing Pt in coverage for Dr Barragan Discussed treatment plan with patient at bedside. I am a non participating [facility] physician seeing Pt in coverage for Dr Barragan. The above patient documentation by Dr. Mckoy was reviewed and I agree with his evaluation, assessment and treatment plan.  Prabhakar Barragan M.D. Discussed treatment plan with patient at bedside. I am a non participating [facility] physician seeing Pt in coverage for Dr Barragan. The above patient documentation by Dr. Mckoy was reviewed and I agree with his evaluation, assessment and treatment plan.  Prabhakar Barragan M.D.

## 2023-12-17 NOTE — ED ADULT NURSE NOTE - OBJECTIVE STATEMENT
83y Female AOx4 with PMH of DM, HTN, high cholesterol presents to the ED s/p fall. Pt reports she felt dizzy and weak prior to fall, fell backwards on buttocks. Endorses right hip pain. Denies head strike, LOC or ac use.  No active bleeding or deformity noted. Denies N/V, fever/chills, SOB, chest pain. Spontaneous/unlabored respirations, speaking in full sentences. Side rails up, bed in lowest position,  safety maintained. Skin intact, reports ambulating at home without any assistance.

## 2023-12-17 NOTE — H&P ADULT - HISTORY OF PRESENT ILLNESS
83y Female presents to ED s/p [facility] fall c/o severe R hip pain and inability to ambulate.  Patient denies headstrike or LOC. Localizes pain to R hip/femur. Patient denies radiation of pain. Patient denies numbness/tingling/burning in the RLE . No other bone/joint complaints. Patient is a community ambulator at baseline  without assistive devices. No anticoagulation    PAST MEDICAL & SURGICAL HISTORY:  Diabetes      Hypertension      High cholesterol      H/O abdominal hysterectomy        MEDICATIONS  (STANDING):  sodium chloride 0.9%. 1000 milliLiter(s) (125 mL/Hr) IV Continuous <Continuous>    MEDICATIONS  (PRN):    Allergies    sulfa drugs (Unknown)    Intolerances        T(C): 36.3 (12-17-23 @ 14:30), Max: 36.3 (12-17-23 @ 11:26)  HR: 66 (12-17-23 @ 14:30) (66 - 72)  BP: 166/73 (12-17-23 @ 14:30) (166/73 - 172/60)  RR: 17 (12-17-23 @ 14:30) (16 - 17)  SpO2: 98% (12-17-23 @ 14:30) (97% - 98%)  Wt(kg): --    PE   RLE:  Skin intact; No ecchymosis/soft tissue swelling  Compartments soft; + TTP about hip. No TTP to knee/leg/ankle/foot   ROM lmited 2/2 pain   Unable to SLR; + Log Roll/Heel Strike  Motor intact GS/TA/FHL/EHL  SILT L2-S1  DP/PT pulses +    Secondary Survey:   No TTP over bony prominences, SILT, palpable pulses, full/painless A/PROM, compartments soft. No TTP over spinous processes or paraspinal muscles at C/T/L spine. No palpable step off. No other injuries or complaints.    Imaging:  XR demonstrating R IT Fx    83y Female with R IT Fx  - Pain control  - NPO/IVF while NPO  - CBC/BMP/Coags/T+S/COVID  - EKG/CXR  - Medical clearance  - Plan for OR for IMN  - Will discuss with attending and update plan accordingly 83y Female presents to ED s/p [facility] fall c/o severe R hip pain and inability to ambulate.  Patient denies headstrike or LOC. Localizes pain to R hip/femur. Patient denies radiation of pain. Patient denies numbness/tingling/burning in the RLE . No other bone/joint complaints. Patient is a community ambulator at baseline  without assistive devices. No anticoagulation    PAST MEDICAL & SURGICAL HISTORY:  Diabetes      Hypertension      High cholesterol      H/O abdominal hysterectomy        MEDICATIONS  (STANDING):  sodium chloride 0.9%. 1000 milliLiter(s) (125 mL/Hr) IV Continuous <Continuous>    MEDICATIONS  (PRN):    Allergies    sulfa drugs (Unknown)    Intolerances        T(C): 36.3 (12-17-23 @ 14:30), Max: 36.3 (12-17-23 @ 11:26)  HR: 66 (12-17-23 @ 14:30) (66 - 72)  BP: 166/73 (12-17-23 @ 14:30) (166/73 - 172/60)  RR: 17 (12-17-23 @ 14:30) (16 - 17)  SpO2: 98% (12-17-23 @ 14:30) (97% - 98%)  Wt(kg): --    PE   RLE:  Skin intact; No ecchymosis/soft tissue swelling  Compartments soft; + TTP about hip. No TTP to knee/leg/ankle/foot   ROM lmited 2/2 pain   Unable to SLR; + Log Roll/Heel Strike  Motor intact GS/TA/FHL/EHL  SILT L2-S1  DP/PT pulses +    Secondary Survey:   No TTP over bony prominences, SILT, palpable pulses, full/painless A/PROM, compartments soft. No TTP over spinous processes or paraspinal muscles at C/T/L spine. No palpable step off. No other injuries or complaints.    Imaging:  XR demonstrating R IT Fx    83y Female with R IT Fx  - Pain control  - NPO/IVF while NPO  - CBC/BMP/Coags/T+S/COVID  - EKG/CXR  - Medical clearance  - Plan for OR for IMN  - Will discuss with attending and update plan accordingly

## 2023-12-17 NOTE — CONSULT NOTE ADULT - ASSESSMENT
84 yo woman with a hx of HTN and diabetes presents after a fall at home with a right hip fracture. Patient is scheduled for an Open reduction and internal fixation of the left hip.   84 yo woman with a hx of HTN and diabetes presents after a fall at home with a right hip fracture. Patient is scheduled for an Open reduction and internal fixation of the right hip.

## 2023-12-18 ENCOUNTER — TRANSCRIPTION ENCOUNTER (OUTPATIENT)
Age: 83
End: 2023-12-18

## 2023-12-18 LAB
ANION GAP SERPL CALC-SCNC: 13 MMOL/L — SIGNIFICANT CHANGE UP (ref 5–17)
ANION GAP SERPL CALC-SCNC: 13 MMOL/L — SIGNIFICANT CHANGE UP (ref 5–17)
BUN SERPL-MCNC: 29 MG/DL — HIGH (ref 7–23)
BUN SERPL-MCNC: 29 MG/DL — HIGH (ref 7–23)
CALCIUM SERPL-MCNC: 8.7 MG/DL — SIGNIFICANT CHANGE UP (ref 8.4–10.5)
CALCIUM SERPL-MCNC: 8.7 MG/DL — SIGNIFICANT CHANGE UP (ref 8.4–10.5)
CHLORIDE SERPL-SCNC: 101 MMOL/L — SIGNIFICANT CHANGE UP (ref 96–108)
CHLORIDE SERPL-SCNC: 101 MMOL/L — SIGNIFICANT CHANGE UP (ref 96–108)
CO2 SERPL-SCNC: 22 MMOL/L — SIGNIFICANT CHANGE UP (ref 22–31)
CO2 SERPL-SCNC: 22 MMOL/L — SIGNIFICANT CHANGE UP (ref 22–31)
CREAT SERPL-MCNC: 1.1 MG/DL — SIGNIFICANT CHANGE UP (ref 0.5–1.3)
CREAT SERPL-MCNC: 1.1 MG/DL — SIGNIFICANT CHANGE UP (ref 0.5–1.3)
EGFR: 50 ML/MIN/1.73M2 — LOW
EGFR: 50 ML/MIN/1.73M2 — LOW
GLUCOSE BLDC GLUCOMTR-MCNC: 195 MG/DL — HIGH (ref 70–99)
GLUCOSE BLDC GLUCOMTR-MCNC: 195 MG/DL — HIGH (ref 70–99)
GLUCOSE BLDC GLUCOMTR-MCNC: 233 MG/DL — HIGH (ref 70–99)
GLUCOSE BLDC GLUCOMTR-MCNC: 233 MG/DL — HIGH (ref 70–99)
GLUCOSE BLDC GLUCOMTR-MCNC: 241 MG/DL — HIGH (ref 70–99)
GLUCOSE BLDC GLUCOMTR-MCNC: 241 MG/DL — HIGH (ref 70–99)
GLUCOSE BLDC GLUCOMTR-MCNC: 258 MG/DL — HIGH (ref 70–99)
GLUCOSE BLDC GLUCOMTR-MCNC: 258 MG/DL — HIGH (ref 70–99)
GLUCOSE BLDC GLUCOMTR-MCNC: 79 MG/DL — SIGNIFICANT CHANGE UP (ref 70–99)
GLUCOSE BLDC GLUCOMTR-MCNC: 79 MG/DL — SIGNIFICANT CHANGE UP (ref 70–99)
GLUCOSE SERPL-MCNC: 301 MG/DL — HIGH (ref 70–99)
GLUCOSE SERPL-MCNC: 301 MG/DL — HIGH (ref 70–99)
HCT VFR BLD CALC: 27 % — LOW (ref 34.5–45)
HCT VFR BLD CALC: 27 % — LOW (ref 34.5–45)
HGB BLD-MCNC: 8.5 G/DL — LOW (ref 11.5–15.5)
HGB BLD-MCNC: 8.5 G/DL — LOW (ref 11.5–15.5)
MCHC RBC-ENTMCNC: 27.5 PG — SIGNIFICANT CHANGE UP (ref 27–34)
MCHC RBC-ENTMCNC: 27.5 PG — SIGNIFICANT CHANGE UP (ref 27–34)
MCHC RBC-ENTMCNC: 31.5 GM/DL — LOW (ref 32–36)
MCHC RBC-ENTMCNC: 31.5 GM/DL — LOW (ref 32–36)
MCV RBC AUTO: 87.4 FL — SIGNIFICANT CHANGE UP (ref 80–100)
MCV RBC AUTO: 87.4 FL — SIGNIFICANT CHANGE UP (ref 80–100)
NRBC # BLD: 0 /100 WBCS — SIGNIFICANT CHANGE UP (ref 0–0)
NRBC # BLD: 0 /100 WBCS — SIGNIFICANT CHANGE UP (ref 0–0)
PLATELET # BLD AUTO: 245 K/UL — SIGNIFICANT CHANGE UP (ref 150–400)
PLATELET # BLD AUTO: 245 K/UL — SIGNIFICANT CHANGE UP (ref 150–400)
POTASSIUM SERPL-MCNC: 4.2 MMOL/L — SIGNIFICANT CHANGE UP (ref 3.5–5.3)
POTASSIUM SERPL-MCNC: 4.2 MMOL/L — SIGNIFICANT CHANGE UP (ref 3.5–5.3)
POTASSIUM SERPL-SCNC: 4.2 MMOL/L — SIGNIFICANT CHANGE UP (ref 3.5–5.3)
POTASSIUM SERPL-SCNC: 4.2 MMOL/L — SIGNIFICANT CHANGE UP (ref 3.5–5.3)
RBC # BLD: 3.09 M/UL — LOW (ref 3.8–5.2)
RBC # BLD: 3.09 M/UL — LOW (ref 3.8–5.2)
RBC # FLD: 13.6 % — SIGNIFICANT CHANGE UP (ref 10.3–14.5)
RBC # FLD: 13.6 % — SIGNIFICANT CHANGE UP (ref 10.3–14.5)
SODIUM SERPL-SCNC: 136 MMOL/L — SIGNIFICANT CHANGE UP (ref 135–145)
SODIUM SERPL-SCNC: 136 MMOL/L — SIGNIFICANT CHANGE UP (ref 135–145)
WBC # BLD: 10.43 K/UL — SIGNIFICANT CHANGE UP (ref 3.8–10.5)
WBC # BLD: 10.43 K/UL — SIGNIFICANT CHANGE UP (ref 3.8–10.5)
WBC # FLD AUTO: 10.43 K/UL — SIGNIFICANT CHANGE UP (ref 3.8–10.5)
WBC # FLD AUTO: 10.43 K/UL — SIGNIFICANT CHANGE UP (ref 3.8–10.5)

## 2023-12-18 RX ORDER — DEXTROSE 50 % IN WATER 50 %
12.5 SYRINGE (ML) INTRAVENOUS ONCE
Refills: 0 | Status: DISCONTINUED | OUTPATIENT
Start: 2023-12-18 | End: 2023-12-20

## 2023-12-18 RX ORDER — GLUCAGON INJECTION, SOLUTION 0.5 MG/.1ML
1 INJECTION, SOLUTION SUBCUTANEOUS ONCE
Refills: 0 | Status: DISCONTINUED | OUTPATIENT
Start: 2023-12-18 | End: 2023-12-20

## 2023-12-18 RX ORDER — LATANOPROST 0.05 MG/ML
1 SOLUTION/ DROPS OPHTHALMIC; TOPICAL AT BEDTIME
Refills: 0 | Status: DISCONTINUED | OUTPATIENT
Start: 2023-12-18 | End: 2023-12-20

## 2023-12-18 RX ORDER — LISINOPRIL 2.5 MG/1
20 TABLET ORAL
Refills: 0 | Status: DISCONTINUED | OUTPATIENT
Start: 2023-12-18 | End: 2023-12-20

## 2023-12-18 RX ORDER — POLYETHYLENE GLYCOL 3350 17 G/17G
17 POWDER, FOR SOLUTION ORAL AT BEDTIME
Refills: 0 | Status: DISCONTINUED | OUTPATIENT
Start: 2023-12-18 | End: 2023-12-20

## 2023-12-18 RX ORDER — SODIUM CHLORIDE 9 MG/ML
1000 INJECTION, SOLUTION INTRAVENOUS
Refills: 0 | Status: DISCONTINUED | OUTPATIENT
Start: 2023-12-18 | End: 2023-12-20

## 2023-12-18 RX ORDER — SENNA PLUS 8.6 MG/1
2 TABLET ORAL AT BEDTIME
Refills: 0 | Status: DISCONTINUED | OUTPATIENT
Start: 2023-12-18 | End: 2023-12-20

## 2023-12-18 RX ORDER — INSULIN LISPRO 100/ML
VIAL (ML) SUBCUTANEOUS AT BEDTIME
Refills: 0 | Status: DISCONTINUED | OUTPATIENT
Start: 2023-12-18 | End: 2023-12-20

## 2023-12-18 RX ORDER — TRAVOPROST 0.04 MG/ML
1 SOLUTION/ DROPS OPHTHALMIC
Refills: 0 | DISCHARGE

## 2023-12-18 RX ORDER — ACETAMINOPHEN 500 MG
975 TABLET ORAL EVERY 8 HOURS
Refills: 0 | Status: DISCONTINUED | OUTPATIENT
Start: 2023-12-18 | End: 2023-12-20

## 2023-12-18 RX ORDER — NEBIVOLOL HYDROCHLORIDE 5 MG/1
10 TABLET ORAL DAILY
Refills: 0 | Status: DISCONTINUED | OUTPATIENT
Start: 2023-12-18 | End: 2023-12-20

## 2023-12-18 RX ORDER — INSULIN LISPRO 100/ML
VIAL (ML) SUBCUTANEOUS
Refills: 0 | Status: DISCONTINUED | OUTPATIENT
Start: 2023-12-18 | End: 2023-12-20

## 2023-12-18 RX ORDER — DEXTROSE 50 % IN WATER 50 %
15 SYRINGE (ML) INTRAVENOUS ONCE
Refills: 0 | Status: DISCONTINUED | OUTPATIENT
Start: 2023-12-18 | End: 2023-12-20

## 2023-12-18 RX ORDER — DEXTROSE 50 % IN WATER 50 %
25 SYRINGE (ML) INTRAVENOUS ONCE
Refills: 0 | Status: DISCONTINUED | OUTPATIENT
Start: 2023-12-18 | End: 2023-12-20

## 2023-12-18 RX ORDER — NEBIVOLOL HYDROCHLORIDE 5 MG/1
1 TABLET ORAL
Refills: 0 | DISCHARGE

## 2023-12-18 RX ADMIN — Medication 2: at 13:18

## 2023-12-18 RX ADMIN — PANTOPRAZOLE SODIUM 40 MILLIGRAM(S): 20 TABLET, DELAYED RELEASE ORAL at 05:51

## 2023-12-18 RX ADMIN — METFORMIN HYDROCHLORIDE 500 MILLIGRAM(S): 850 TABLET ORAL at 06:23

## 2023-12-18 RX ADMIN — METFORMIN HYDROCHLORIDE 500 MILLIGRAM(S): 850 TABLET ORAL at 17:32

## 2023-12-18 RX ADMIN — Medication 3: at 17:32

## 2023-12-18 RX ADMIN — Medication 81 MILLIGRAM(S): at 12:58

## 2023-12-18 RX ADMIN — Medication 200 MILLIGRAM(S): at 05:52

## 2023-12-18 RX ADMIN — Medication 200 MILLIGRAM(S): at 21:46

## 2023-12-18 RX ADMIN — TRAMADOL HYDROCHLORIDE 50 MILLIGRAM(S): 50 TABLET ORAL at 22:45

## 2023-12-18 RX ADMIN — Medication 100 MILLIGRAM(S): at 05:52

## 2023-12-18 RX ADMIN — ENOXAPARIN SODIUM 40 MILLIGRAM(S): 100 INJECTION SUBCUTANEOUS at 05:52

## 2023-12-18 RX ADMIN — TRAMADOL HYDROCHLORIDE 50 MILLIGRAM(S): 50 TABLET ORAL at 00:00

## 2023-12-18 RX ADMIN — LISINOPRIL 20 MILLIGRAM(S): 2.5 TABLET ORAL at 05:51

## 2023-12-18 RX ADMIN — ATORVASTATIN CALCIUM 40 MILLIGRAM(S): 80 TABLET, FILM COATED ORAL at 21:45

## 2023-12-18 RX ADMIN — Medication 1: at 09:52

## 2023-12-18 RX ADMIN — TRAMADOL HYDROCHLORIDE 50 MILLIGRAM(S): 50 TABLET ORAL at 21:45

## 2023-12-18 RX ADMIN — LATANOPROST 1 DROP(S): 0.05 SOLUTION/ DROPS OPHTHALMIC; TOPICAL at 21:50

## 2023-12-18 RX ADMIN — Medication 200 MILLIGRAM(S): at 13:57

## 2023-12-18 NOTE — DISCHARGE NOTE PROVIDER - NSDCCPCAREPLAN_GEN_ALL_CORE_FT
PRINCIPAL DISCHARGE DIAGNOSIS  Diagnosis: Closed fracture of trochanter of right femur, initial encounter  Assessment and Plan of Treatment:

## 2023-12-18 NOTE — PHYSICAL THERAPY INITIAL EVALUATION ADULT - PHYSICAL ASSIST/NONPHYSICAL ASSIST, REHAB EVAL
October 16, 2017      [facility]  [address] [address]           [place] [facility] Internal Medicine  [address] [address]  Phone: [phone]  Fax: [phone]          Patient: Adore Fisher   MR Number: [id number]   YOB: 1989   Date of Visit: 10/16/2017       Dear [facility]:    Thank you for referring Adore Fisher to me for evaluation. Attached you will find relevant portions of my assessment and plan of care.    If you have questions, please do not hesitate to call me. I look forward to following Adore Fisher along with you.    Sincerely,    Allie Khan, HUSSAIN    Enclosure  CC:  No Recipients    If you would like to receive this communication electronically, please contact [email or web address] or [phone] to request more information on [employer] Link access.    For providers and/or their staff who would like to refer a patient to [facility], please contact us through our one-stop-shop provider referral line, Nilam Lopez, at [phone].    If you feel you have received this communication in error or would no longer like to receive these types of communications, please e-mail [email or web address]          verbal cues/1 person assist

## 2023-12-18 NOTE — PROGRESS NOTE ADULT - ASSESSMENT
82 yo woman with a hx of HTN and diabetes presents after a fall at home with a right hip fracture. Patient is scheduled for an Open reduction and internal fixation of the left hip.

## 2023-12-18 NOTE — PROGRESS NOTE ADULT - ASSESSMENT
A/P:  83F POD#1 s/p R femur IMN, recovering well.    - Pain control  - WBAT RLE   - DVT ppx: Lovenox, ASA81  - PT/OT  - OOB  - Diet: Reg  - Monitor I&Os  - Dispo: JOHAN, will work w PT today      For all questions related to patient care, please reach out via the on-call pager.*     Hetal Elizalde, PGY2  Orthopedic Surgery  [facility]: [id number]  [facility]: [id number]  [facility]: [id number] A/P:  83F POD#1 s/p R femur IMN, recovering well.    - Pain control  - WBAT RLE   - DVT ppx: Lovenox, ASA81  - PT/OT  - OOB  - Diet: Reg  - Monitor I&Os  - Dispo: JOHAN, will work w PT today      For all questions related to patient care, please reach out via the on-call pager.*     Hetal Elizalde, PGY2  Orthopedic Surgery  [facility]: [phone]  [facility]: [id number]  [facility]: [id number]

## 2023-12-18 NOTE — DISCHARGE NOTE PROVIDER - NSDCMRMEDTOKEN_GEN_ALL_CORE_FT
Aspir 81:  orally   atorvastatin:  orally   cefdinir 300 mg oral capsule: 1 cap(s) orally 2 times a day   lisinopril: 20 milligram(s) orally once a day  metFORMIN: 500 milligram(s) orally 2 times a day  Pyridium 200 mg oral tablet: 1 tab(s) orally 3 times a day (after meals)   acetaminophen 325 mg oral tablet: 3 tab(s) orally every 8 hours as needed for fever, H/A, mild pain  Aspir 81: orally once a day  atorvastatin: 1 dose(s) orally once a day (at bedtime)  enoxaparin: 1 dose(s) subcutaneous once a day 40 units SQ daily for 1 month post op  latanoprost 0.005% ophthalmic solution: 1 drop(s) to each affected eye once a day (at bedtime)  lisinopril: 20 milligram(s) orally once a day  metFORMIN: 500 milligram(s) orally 2 times a day  nebivolol 10 mg oral tablet: 1 tab(s) orally once a day  oxyCODONE 5 mg oral tablet: 1 tab(s) orally every 4 hours as needed for Severe Pain (7 - 10)  pantoprazole 40 mg oral delayed release tablet: 1 tab(s) orally once a day (before a meal)  Pyridium 200 mg oral tablet: 1 tab(s) orally 3 times a day (after meals)  traMADol 50 mg oral tablet: 1 tab(s) orally every 4 hours as needed for  moderate pain  travoprost 0.004% ophthalmic solution: 1 drop(s) in each affected eye once a day (at bedtime)

## 2023-12-18 NOTE — OCCUPATIONAL THERAPY INITIAL EVALUATION ADULT - PERTINENT HX OF CURRENT PROBLEM, REHAB EVAL
83y Female presents to ED s/p [facility] fall c/o severe R hip pain and inability to ambulate.  Patient denies headstrike or LOC. Localizes pain to R hip/femur-found w/ R IT fx. Patient denies radiation of pain. Patient denies numbness/tingling/burning in the RLE . No other bone/joint complaints. Patient is a community ambulator at baseline  without assistive devices. No anticoagulation  POD#1 s/p R femur IMN, 83y Female presents to ED s/p [facility] fall c/o severe R hip pain and inability to ambulate.  Patient denies headstrike or LOC. Localizes pain to R hip/femur-found w/ R IT fx. Patient denies radiation of pain. Patient denies numbness/tingling/burning in the RLE . No other bone/joint complaints. Patient is a community ambulator at baseline  without assistive devices. No anticoagulation  POD#1 s/p R femur IMN,

## 2023-12-18 NOTE — DISCHARGE NOTE PROVIDER - NSDCFUADDINST_GEN_ALL_CORE_FT
Continue weight bearing as tolerated ambulation with rolling walker. Keep surgical incisions/dressings clean and dry, have dressings/sutures/staples removed and steri-strips applied post operative day #12 (12/29/2023)if applicable.

## 2023-12-18 NOTE — DISCHARGE NOTE PROVIDER - HOSPITAL COURSE
Reason for Admission: R IT Fx  History of Present Illness:   83y Female presents to ED s/p mech fall c/o severe R hip pain and inability to ambulate.  Patient denies headstrike or LOC. Localizes pain to R hip/femur. Patient denies radiation of pain. Patient denies numbness/tingling/burning in the RLE . No other bone/joint complaints. Patient is a community ambulator at baseline  without assistive devices. No anticoagulation    PAST MEDICAL & SURGICAL HISTORY:  Diabetes  Hypertension  High cholesterol  H/O abdominal hysterectomy    HOSPITAL COURSE:  84 y/o FM underwent right hip fracture fixation with IM nail on 12/17/2023 with Dr. Shelton.  Patient tolerated procedure well.  Patient was evaluated postoperatively by physical and occupational therapists for weight bearing as tolerated ambulation and advised that patient would benefit from admission to rehab facility.  Patient advised to keep surgical incision/dressing clean and dry, and have dressing/sutures/surgical staples removed and steri strips applied post op day #12 (12/29/2023)if applicable.  Patient further advised to follow up with Dr. Shelton in his office 3-4 weeks post op.   Reason for Admission: R IT Fx  History of Present Illness:   83y Female presents to ED s/p mech fall c/o severe R hip pain and inability to ambulate.  Patient denies headstrike or LOC. Localizes pain to R hip/femur. Patient denies radiation of pain. Patient denies numbness/tingling/burning in the RLE . No other bone/joint complaints. Patient is a community ambulator at baseline  without assistive devices. No anticoagulation    PAST MEDICAL & SURGICAL HISTORY:  Diabetes  Hypertension  High cholesterol  H/O abdominal hysterectomy    HOSPITAL COURSE:  82 y/o FM underwent right hip fracture fixation with IM nail on 12/17/2023 with Dr. Shelton.  Patient tolerated procedure well.  Patient was evaluated postoperatively by physical and occupational therapists for weight bearing as tolerated ambulation and advised that patient would benefit from admission to rehab facility.  Patient advised to keep surgical incision/dressing clean and dry, and have dressing/sutures/surgical staples removed and steri strips applied post op day #12 (12/29/2023)if applicable.  Patient further advised to follow up with Dr. Shelton in his office 3-4 weeks post op.

## 2023-12-18 NOTE — PHYSICAL THERAPY INITIAL EVALUATION ADULT - PERTINENT HX OF CURRENT PROBLEM, REHAB EVAL
84 y/o F w/ the below PMH here s/p MF was found to have R IT fx now s/p R femur IMN. CTH: No evidence of acute intracranial hemorrhage or midline shift. 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 Chronic small vessel disease.

## 2023-12-18 NOTE — PROGRESS NOTE ADULT - SUBJECTIVE AND OBJECTIVE BOX
82 y/o F PmHx DM, HTN and Hypercholesterolemia presents following fall onto R-side. Patient states she slipped while in the kitchen at 9:30AM. Following the fall c/o R hip pain. Denies head strike, LOC, nausea, vomiting. She denies numbness/tingling b/l lower extremity, bowel and bladder incontinence. The patient was unable to bear weight on her R leg following the fall. Denies upper extremity pain, chest pain, SOB. She was brought to [facility] for further evaluation and treatment. In the ED she was found to have a right hip fracture and is s/p an Open reduction and internal fixation of the left hip. Patient tolerated the procedure well.       MEDICATIONS  (STANDING):  aspirin enteric coated 81 milliGRAM(s) Oral daily  atorvastatin 40 milliGRAM(s) Oral at bedtime  dextrose 5%. 1000 milliLiter(s) (50 mL/Hr) IV Continuous <Continuous>  dextrose 5%. 1000 milliLiter(s) (100 mL/Hr) IV Continuous <Continuous>  dextrose 50% Injectable 25 Gram(s) IV Push once  dextrose 50% Injectable 12.5 Gram(s) IV Push once  dextrose 50% Injectable 25 Gram(s) IV Push once  enoxaparin Injectable 40 milliGRAM(s) SubCutaneous every 24 hours  glucagon  Injectable 1 milliGRAM(s) IntraMuscular once  insulin lispro (ADMELOG) corrective regimen sliding scale   SubCutaneous three times a day before meals  insulin lispro (ADMELOG) corrective regimen sliding scale   SubCutaneous at bedtime  lactated ringers. 1000 milliLiter(s) (75 mL/Hr) IV Continuous <Continuous>  lisinopril 20 milliGRAM(s) Oral daily  metFORMIN 500 milliGRAM(s) Oral two times a day  ondansetron    Tablet 4 milliGRAM(s) Oral every 8 hours  pantoprazole    Tablet 40 milliGRAM(s) Oral before breakfast  phenazopyridine 200 milliGRAM(s) Oral three times a day  sodium chloride 0.9%. 1000 milliLiter(s) (125 mL/Hr) IV Continuous <Continuous>    MEDICATIONS  (PRN):  dextrose Oral Gel 15 Gram(s) Oral once PRN Blood Glucose LESS THAN 70 milliGRAM(s)/deciliter  oxyCODONE    IR 2.5 milliGRAM(s) Oral every 4 hours PRN Moderate Pain (4 - 6)  oxyCODONE    IR 5 milliGRAM(s) Oral every 4 hours PRN Severe Pain (7 - 10)  traMADol 50 milliGRAM(s) Oral every 4 hours PRN Mild Pain (1 - 3)          VITALS:   T(C): 36.4 (12-18-23 @ 11:55), Max: 36.5 (12-18-23 @ 04:19)  HR: 78 (12-18-23 @ 12:12) (66 - 87)  BP: 121/69 (12-18-23 @ 12:12) (114/66 - 166/73)  RR: 18 (12-18-23 @ 11:55) (15 - 18)  SpO2: 98% (12-18-23 @ 12:12) (95% - 100%)  Wt(kg): --    PHYSICAL EXAM:  GENERAL: NAD, well-groomed, well-developed  HEAD:  Atraumatic, Normocephalic  EYES: EOMI, PERRLA, conjunctiva and sclera clear  ENMT: No tonsillar erythema, exudates, or enlargement; Moist mucous membranes, Good dentition, No lesions  NECK: Supple, No JVD, Normal thyroid  NERVOUS SYSTEM:  Alert & Oriented X3, Good concentration; Motor Strength 5/5 B/L upper and lower extremities; DTRs 2+ intact and symmetric  CHEST/LUNG: Clear to percussion bilaterally; No rales, rhonchi, wheezing, or rubs  HEART: Regular rate and rhythm; No murmurs, rubs, or gallops  ABDOMEN: Soft, Nontender, Nondistended; Bowel sounds present  EXTREMITIES:  2+ Peripheral Pulses, No clubbing, cyanosis, or edema  LYMPH: No lymphadenopathy noted  SKIN: No rashes or lesions    LABS:        CBC Full  -  ( 18 Dec 2023 06:52 )  WBC Count : 10.43 K/uL  RBC Count : 3.09 M/uL  Hemoglobin : 8.5 g/dL  Hematocrit : 27.0 %  Platelet Count - Automated : 245 K/uL  Mean Cell Volume : 87.4 fl  Mean Cell Hemoglobin : 27.5 pg  Mean Cell Hemoglobin Concentration : 31.5 gm/dL  Auto Neutrophil # : x  Auto Lymphocyte # : x  Auto Monocyte # : x  Auto Eosinophil # : x  Auto Basophil # : x  Auto Neutrophil % : x  Auto Lymphocyte % : x  Auto Monocyte % : x  Auto Eosinophil % : x  Auto Basophil % : x    12-18    136  |  101  |  29<H>  ----------------------------<  301<H>  4.2   |  22  |  1.10    Ca    8.7      18 Dec 2023 06:48  Mg     1.3     12-17    TPro  6.7  /  Alb  4.1  /  TBili  0.5  /  DBili  x   /  AST  22  /  ALT  19  /  AlkPhos  78  12-17    LIVER FUNCTIONS - ( 17 Dec 2023 12:38 )  Alb: 4.1 g/dL / Pro: 6.7 g/dL / ALK PHOS: 78 U/L / ALT: 19 U/L / AST: 22 U/L / GGT: x           PT/INR - ( 17 Dec 2023 12:38 )   PT: 10.5 sec;   INR: 0.95 ratio         PTT - ( 17 Dec 2023 12:38 )  PTT:30.3 sec  Urinalysis Basic - ( 18 Dec 2023 06:48 )    Color: x / Appearance: x / SG: x / pH: x  Gluc: 301 mg/dL / Ketone: x  / Bili: x / Urobili: x   Blood: x / Protein: x / Nitrite: x   Leuk Esterase: x / RBC: x / WBC x   Sq Epi: x / Non Sq Epi: x / Bacteria: x      CAPILLARY BLOOD GLUCOSE      POCT Blood Glucose.: 241 mg/dL (18 Dec 2023 12:27)  POCT Blood Glucose.: 195 mg/dL (18 Dec 2023 09:40)  POCT Blood Glucose.: 222 mg/dL (17 Dec 2023 21:21)  POCT Blood Glucose.: 87 mg/dL (17 Dec 2023 18:41)  POCT Blood Glucose.: 79 mg/dL (17 Dec 2023 18:22)      RADIOLOGY & ADDITIONAL TESTS:       84 y/o F PmHx DM, HTN and Hypercholesterolemia presents following fall onto R-side. Patient states she slipped while in the kitchen at 9:30AM. Following the fall c/o R hip pain. Denies head strike, LOC, nausea, vomiting. She denies numbness/tingling b/l lower extremity, bowel and bladder incontinence. The patient was unable to bear weight on her R leg following the fall. Denies upper extremity pain, chest pain, SOB. She was brought to [facility] for further evaluation and treatment. In the ED she was found to have a right hip fracture and is s/p an Open reduction and internal fixation of the left hip. Patient tolerated the procedure well.       MEDICATIONS  (STANDING):  aspirin enteric coated 81 milliGRAM(s) Oral daily  atorvastatin 40 milliGRAM(s) Oral at bedtime  dextrose 5%. 1000 milliLiter(s) (50 mL/Hr) IV Continuous <Continuous>  dextrose 5%. 1000 milliLiter(s) (100 mL/Hr) IV Continuous <Continuous>  dextrose 50% Injectable 25 Gram(s) IV Push once  dextrose 50% Injectable 12.5 Gram(s) IV Push once  dextrose 50% Injectable 25 Gram(s) IV Push once  enoxaparin Injectable 40 milliGRAM(s) SubCutaneous every 24 hours  glucagon  Injectable 1 milliGRAM(s) IntraMuscular once  insulin lispro (ADMELOG) corrective regimen sliding scale   SubCutaneous three times a day before meals  insulin lispro (ADMELOG) corrective regimen sliding scale   SubCutaneous at bedtime  lactated ringers. 1000 milliLiter(s) (75 mL/Hr) IV Continuous <Continuous>  lisinopril 20 milliGRAM(s) Oral daily  metFORMIN 500 milliGRAM(s) Oral two times a day  ondansetron    Tablet 4 milliGRAM(s) Oral every 8 hours  pantoprazole    Tablet 40 milliGRAM(s) Oral before breakfast  phenazopyridine 200 milliGRAM(s) Oral three times a day  sodium chloride 0.9%. 1000 milliLiter(s) (125 mL/Hr) IV Continuous <Continuous>    MEDICATIONS  (PRN):  dextrose Oral Gel 15 Gram(s) Oral once PRN Blood Glucose LESS THAN 70 milliGRAM(s)/deciliter  oxyCODONE    IR 2.5 milliGRAM(s) Oral every 4 hours PRN Moderate Pain (4 - 6)  oxyCODONE    IR 5 milliGRAM(s) Oral every 4 hours PRN Severe Pain (7 - 10)  traMADol 50 milliGRAM(s) Oral every 4 hours PRN Mild Pain (1 - 3)          VITALS:   T(C): 36.4 (12-18-23 @ 11:55), Max: 36.5 (12-18-23 @ 04:19)  HR: 78 (12-18-23 @ 12:12) (66 - 87)  BP: 121/69 (12-18-23 @ 12:12) (114/66 - 166/73)  RR: 18 (12-18-23 @ 11:55) (15 - 18)  SpO2: 98% (12-18-23 @ 12:12) (95% - 100%)  Wt(kg): --    PHYSICAL EXAM:  GENERAL: NAD, well-groomed, well-developed  HEAD:  Atraumatic, Normocephalic  EYES: EOMI, PERRLA, conjunctiva and sclera clear  ENMT: No tonsillar erythema, exudates, or enlargement; Moist mucous membranes, Good dentition, No lesions  NECK: Supple, No JVD, Normal thyroid  NERVOUS SYSTEM:  Alert & Oriented X3, Good concentration; Motor Strength 5/5 B/L upper and lower extremities; DTRs 2+ intact and symmetric  CHEST/LUNG: Clear to percussion bilaterally; No rales, rhonchi, wheezing, or rubs  HEART: Regular rate and rhythm; No murmurs, rubs, or gallops  ABDOMEN: Soft, Nontender, Nondistended; Bowel sounds present  EXTREMITIES:  2+ Peripheral Pulses, No clubbing, cyanosis, or edema  LYMPH: No lymphadenopathy noted  SKIN: No rashes or lesions    LABS:        CBC Full  -  ( 18 Dec 2023 06:52 )  WBC Count : 10.43 K/uL  RBC Count : 3.09 M/uL  Hemoglobin : 8.5 g/dL  Hematocrit : 27.0 %  Platelet Count - Automated : 245 K/uL  Mean Cell Volume : 87.4 fl  Mean Cell Hemoglobin : 27.5 pg  Mean Cell Hemoglobin Concentration : 31.5 gm/dL  Auto Neutrophil # : x  Auto Lymphocyte # : x  Auto Monocyte # : x  Auto Eosinophil # : x  Auto Basophil # : x  Auto Neutrophil % : x  Auto Lymphocyte % : x  Auto Monocyte % : x  Auto Eosinophil % : x  Auto Basophil % : x    12-18    136  |  101  |  29<H>  ----------------------------<  301<H>  4.2   |  22  |  1.10    Ca    8.7      18 Dec 2023 06:48  Mg     1.3     12-17    TPro  6.7  /  Alb  4.1  /  TBili  0.5  /  DBili  x   /  AST  22  /  ALT  19  /  AlkPhos  78  12-17    LIVER FUNCTIONS - ( 17 Dec 2023 12:38 )  Alb: 4.1 g/dL / Pro: 6.7 g/dL / ALK PHOS: 78 U/L / ALT: 19 U/L / AST: 22 U/L / GGT: x           PT/INR - ( 17 Dec 2023 12:38 )   PT: 10.5 sec;   INR: 0.95 ratio         PTT - ( 17 Dec 2023 12:38 )  PTT:30.3 sec  Urinalysis Basic - ( 18 Dec 2023 06:48 )    Color: x / Appearance: x / SG: x / pH: x  Gluc: 301 mg/dL / Ketone: x  / Bili: x / Urobili: x   Blood: x / Protein: x / Nitrite: x   Leuk Esterase: x / RBC: x / WBC x   Sq Epi: x / Non Sq Epi: x / Bacteria: x      CAPILLARY BLOOD GLUCOSE      POCT Blood Glucose.: 241 mg/dL (18 Dec 2023 12:27)  POCT Blood Glucose.: 195 mg/dL (18 Dec 2023 09:40)  POCT Blood Glucose.: 222 mg/dL (17 Dec 2023 21:21)  POCT Blood Glucose.: 87 mg/dL (17 Dec 2023 18:41)  POCT Blood Glucose.: 79 mg/dL (17 Dec 2023 18:22)      RADIOLOGY & ADDITIONAL TESTS:

## 2023-12-18 NOTE — PROGRESS NOTE ADULT - SUBJECTIVE AND OBJECTIVE BOX
Orthopedic Surgery Progress Note     S: Patient seen and examined today. No acute events overnight. Pain is well controlled. Denies f/c, chest pain, shortness of breath, dizziness.    MEDICATIONS  (STANDING):  aspirin enteric coated 81 milliGRAM(s) Oral daily  atorvastatin 40 milliGRAM(s) Oral at bedtime  dextrose 5%. 1000 milliLiter(s) (50 mL/Hr) IV Continuous <Continuous>  dextrose 5%. 1000 milliLiter(s) (100 mL/Hr) IV Continuous <Continuous>  dextrose 50% Injectable 12.5 Gram(s) IV Push once  dextrose 50% Injectable 25 Gram(s) IV Push once  dextrose 50% Injectable 25 Gram(s) IV Push once  enoxaparin Injectable 40 milliGRAM(s) SubCutaneous every 24 hours  glucagon  Injectable 1 milliGRAM(s) IntraMuscular once  insulin lispro (ADMELOG) corrective regimen sliding scale   SubCutaneous three times a day before meals  insulin lispro (ADMELOG) corrective regimen sliding scale   SubCutaneous at bedtime  lactated ringers. 1000 milliLiter(s) (75 mL/Hr) IV Continuous <Continuous>  lisinopril 20 milliGRAM(s) Oral daily  metFORMIN 500 milliGRAM(s) Oral two times a day  ondansetron    Tablet 4 milliGRAM(s) Oral every 8 hours  pantoprazole    Tablet 40 milliGRAM(s) Oral before breakfast  phenazopyridine 200 milliGRAM(s) Oral three times a day  sodium chloride 0.9%. 1000 milliLiter(s) (125 mL/Hr) IV Continuous <Continuous>    MEDICATIONS  (PRN):  dextrose Oral Gel 15 Gram(s) Oral once PRN Blood Glucose LESS THAN 70 milliGRAM(s)/deciliter  oxyCODONE    IR 2.5 milliGRAM(s) Oral every 4 hours PRN Moderate Pain (4 - 6)  oxyCODONE    IR 5 milliGRAM(s) Oral every 4 hours PRN Severe Pain (7 - 10)  traMADol 50 milliGRAM(s) Oral every 4 hours PRN Mild Pain (1 - 3)      Vital Signs Last 24 Hrs  T(C): 36.5 (18 Dec 2023 04:19), Max: 36.5 (18 Dec 2023 04:19)  T(F): 97.7 (18 Dec 2023 04:19), Max: 97.7 (18 Dec 2023 04:19)  HR: 87 (18 Dec 2023 04:19) (66 - 87)  BP: 134/60 (18 Dec 2023 04:19) (114/66 - 172/60)  BP(mean): 85 (17 Dec 2023 19:45) (79 - 104)  RR: 18 (18 Dec 2023 04:19) (15 - 18)  SpO2: 95% (18 Dec 2023 04:19) (95% - 100%)    Parameters below as of 18 Dec 2023 04:19  Patient On (Oxygen Delivery Method): room air        12-17-23 @ 07:01  -  12-18-23 @ 06:39  --------------------------------------------------------  IN: 225 mL / OUT: 150 mL / NET: 75 mL        Physical Exam:  Gen: NAD  R Lower Extremity:  Incision c/d/i  SILT S/S/DP/SP/T  +EHL/FHL/TA/GSC  Compartments soft/non-tender  Toes warm, Cap refill brisk/warm and perfused, +DP/PT pulse         LABS:                        9.7    14.87 )-----------( 240      ( 17 Dec 2023 18:55 )             30.0     12-17    137  |  103  |  25<H>  ----------------------------<  78  4.0   |  23  |  0.97    Ca    9.3      17 Dec 2023 18:55  Mg     1.3     12-17    TPro  6.7  /  Alb  4.1  /  TBili  0.5  /  DBili  x   /  AST  22  /  ALT  19  /  AlkPhos  78  12-17

## 2023-12-18 NOTE — PROGRESS NOTE ADULT - TIME BILLING
Discussed treatment plan with patient and family  at bedside. I am a non participating [facility] physician seeing Pt in coverage for Dr Barragan. The above patient documentation by Dr. Mckoy was reviewed and I agree with his evaluation, assessment and treatment plan.  Prabhakar Barragan M.D. Discussed treatment plan with patient and family  at bedside. I am a non participating [facility] physician seeing Pt in coverage for Dr Barragan. The above patient documentation by Dr. Mckoy was reviewed and I agree with his evaluation, assessment and treatment plan.  Prabhakar Barragan M.D.

## 2023-12-18 NOTE — DISCHARGE NOTE PROVIDER - CARE PROVIDER_API CALL
Garrett Shelton  Orthopaedic Surgery  [address], [address] [place] [address]  Phone: [phone]  Fax: [phone]  Follow Up Time:    Garrett Shelton  Orthopaedic Surgery  [address], [address] [place] [address]  Phone: [phone]  Fax: [phone]  Follow Up Time:

## 2023-12-19 DIAGNOSIS — D62 ACUTE POSTHEMORRHAGIC ANEMIA: ICD-10-CM

## 2023-12-19 LAB
A1C WITH ESTIMATED AVERAGE GLUCOSE RESULT: 6.7 % — HIGH (ref 4–5.6)
A1C WITH ESTIMATED AVERAGE GLUCOSE RESULT: 6.7 % — HIGH (ref 4–5.6)
ANION GAP SERPL CALC-SCNC: 11 MMOL/L — SIGNIFICANT CHANGE UP (ref 5–17)
ANION GAP SERPL CALC-SCNC: 11 MMOL/L — SIGNIFICANT CHANGE UP (ref 5–17)
BUN SERPL-MCNC: 25 MG/DL — HIGH (ref 7–23)
BUN SERPL-MCNC: 25 MG/DL — HIGH (ref 7–23)
CALCIUM SERPL-MCNC: 9.3 MG/DL — SIGNIFICANT CHANGE UP (ref 8.4–10.5)
CALCIUM SERPL-MCNC: 9.3 MG/DL — SIGNIFICANT CHANGE UP (ref 8.4–10.5)
CHLORIDE SERPL-SCNC: 103 MMOL/L — SIGNIFICANT CHANGE UP (ref 96–108)
CHLORIDE SERPL-SCNC: 103 MMOL/L — SIGNIFICANT CHANGE UP (ref 96–108)
CO2 SERPL-SCNC: 24 MMOL/L — SIGNIFICANT CHANGE UP (ref 22–31)
CO2 SERPL-SCNC: 24 MMOL/L — SIGNIFICANT CHANGE UP (ref 22–31)
CREAT SERPL-MCNC: 1.29 MG/DL — SIGNIFICANT CHANGE UP (ref 0.5–1.3)
CREAT SERPL-MCNC: 1.29 MG/DL — SIGNIFICANT CHANGE UP (ref 0.5–1.3)
EGFR: 41 ML/MIN/1.73M2 — LOW
EGFR: 41 ML/MIN/1.73M2 — LOW
ESTIMATED AVERAGE GLUCOSE: 146 MG/DL — HIGH (ref 68–114)
ESTIMATED AVERAGE GLUCOSE: 146 MG/DL — HIGH (ref 68–114)
GLUCOSE BLDC GLUCOMTR-MCNC: 167 MG/DL — HIGH (ref 70–99)
GLUCOSE BLDC GLUCOMTR-MCNC: 167 MG/DL — HIGH (ref 70–99)
GLUCOSE BLDC GLUCOMTR-MCNC: 192 MG/DL — HIGH (ref 70–99)
GLUCOSE BLDC GLUCOMTR-MCNC: 192 MG/DL — HIGH (ref 70–99)
GLUCOSE BLDC GLUCOMTR-MCNC: 208 MG/DL — HIGH (ref 70–99)
GLUCOSE BLDC GLUCOMTR-MCNC: 208 MG/DL — HIGH (ref 70–99)
GLUCOSE BLDC GLUCOMTR-MCNC: 248 MG/DL — HIGH (ref 70–99)
GLUCOSE BLDC GLUCOMTR-MCNC: 248 MG/DL — HIGH (ref 70–99)
GLUCOSE SERPL-MCNC: 181 MG/DL — HIGH (ref 70–99)
GLUCOSE SERPL-MCNC: 181 MG/DL — HIGH (ref 70–99)
HCT VFR BLD CALC: 24.7 % — LOW (ref 34.5–45)
HCT VFR BLD CALC: 24.7 % — LOW (ref 34.5–45)
HGB BLD-MCNC: 8.1 G/DL — LOW (ref 11.5–15.5)
HGB BLD-MCNC: 8.1 G/DL — LOW (ref 11.5–15.5)
MCHC RBC-ENTMCNC: 28 PG — SIGNIFICANT CHANGE UP (ref 27–34)
MCHC RBC-ENTMCNC: 28 PG — SIGNIFICANT CHANGE UP (ref 27–34)
MCHC RBC-ENTMCNC: 32.8 GM/DL — SIGNIFICANT CHANGE UP (ref 32–36)
MCHC RBC-ENTMCNC: 32.8 GM/DL — SIGNIFICANT CHANGE UP (ref 32–36)
MCV RBC AUTO: 85.5 FL — SIGNIFICANT CHANGE UP (ref 80–100)
MCV RBC AUTO: 85.5 FL — SIGNIFICANT CHANGE UP (ref 80–100)
NRBC # BLD: 0 /100 WBCS — SIGNIFICANT CHANGE UP (ref 0–0)
NRBC # BLD: 0 /100 WBCS — SIGNIFICANT CHANGE UP (ref 0–0)
PLATELET # BLD AUTO: 212 K/UL — SIGNIFICANT CHANGE UP (ref 150–400)
PLATELET # BLD AUTO: 212 K/UL — SIGNIFICANT CHANGE UP (ref 150–400)
POTASSIUM SERPL-MCNC: 4.2 MMOL/L — SIGNIFICANT CHANGE UP (ref 3.5–5.3)
POTASSIUM SERPL-MCNC: 4.2 MMOL/L — SIGNIFICANT CHANGE UP (ref 3.5–5.3)
POTASSIUM SERPL-SCNC: 4.2 MMOL/L — SIGNIFICANT CHANGE UP (ref 3.5–5.3)
POTASSIUM SERPL-SCNC: 4.2 MMOL/L — SIGNIFICANT CHANGE UP (ref 3.5–5.3)
RBC # BLD: 2.89 M/UL — LOW (ref 3.8–5.2)
RBC # BLD: 2.89 M/UL — LOW (ref 3.8–5.2)
RBC # FLD: 14 % — SIGNIFICANT CHANGE UP (ref 10.3–14.5)
RBC # FLD: 14 % — SIGNIFICANT CHANGE UP (ref 10.3–14.5)
SODIUM SERPL-SCNC: 138 MMOL/L — SIGNIFICANT CHANGE UP (ref 135–145)
SODIUM SERPL-SCNC: 138 MMOL/L — SIGNIFICANT CHANGE UP (ref 135–145)
WBC # BLD: 9.58 K/UL — SIGNIFICANT CHANGE UP (ref 3.8–10.5)
WBC # BLD: 9.58 K/UL — SIGNIFICANT CHANGE UP (ref 3.8–10.5)
WBC # FLD AUTO: 9.58 K/UL — SIGNIFICANT CHANGE UP (ref 3.8–10.5)
WBC # FLD AUTO: 9.58 K/UL — SIGNIFICANT CHANGE UP (ref 3.8–10.5)

## 2023-12-19 RX ADMIN — LATANOPROST 1 DROP(S): 0.05 SOLUTION/ DROPS OPHTHALMIC; TOPICAL at 22:01

## 2023-12-19 RX ADMIN — Medication 975 MILLIGRAM(S): at 13:57

## 2023-12-19 RX ADMIN — METFORMIN HYDROCHLORIDE 500 MILLIGRAM(S): 850 TABLET ORAL at 17:42

## 2023-12-19 RX ADMIN — TRAMADOL HYDROCHLORIDE 50 MILLIGRAM(S): 50 TABLET ORAL at 09:05

## 2023-12-19 RX ADMIN — ONDANSETRON 4 MILLIGRAM(S): 8 TABLET, FILM COATED ORAL at 13:58

## 2023-12-19 RX ADMIN — Medication 975 MILLIGRAM(S): at 07:17

## 2023-12-19 RX ADMIN — Medication 200 MILLIGRAM(S): at 13:58

## 2023-12-19 RX ADMIN — ATORVASTATIN CALCIUM 40 MILLIGRAM(S): 80 TABLET, FILM COATED ORAL at 22:02

## 2023-12-19 RX ADMIN — Medication 2: at 08:07

## 2023-12-19 RX ADMIN — Medication 200 MILLIGRAM(S): at 22:02

## 2023-12-19 RX ADMIN — Medication 81 MILLIGRAM(S): at 11:16

## 2023-12-19 RX ADMIN — LISINOPRIL 20 MILLIGRAM(S): 2.5 TABLET ORAL at 06:29

## 2023-12-19 RX ADMIN — SENNA PLUS 2 TABLET(S): 8.6 TABLET ORAL at 22:03

## 2023-12-19 RX ADMIN — Medication 1: at 18:00

## 2023-12-19 RX ADMIN — ONDANSETRON 4 MILLIGRAM(S): 8 TABLET, FILM COATED ORAL at 06:28

## 2023-12-19 RX ADMIN — Medication 2: at 12:52

## 2023-12-19 RX ADMIN — METFORMIN HYDROCHLORIDE 500 MILLIGRAM(S): 850 TABLET ORAL at 06:29

## 2023-12-19 RX ADMIN — PANTOPRAZOLE SODIUM 40 MILLIGRAM(S): 20 TABLET, DELAYED RELEASE ORAL at 06:29

## 2023-12-19 RX ADMIN — ENOXAPARIN SODIUM 40 MILLIGRAM(S): 100 INJECTION SUBCUTANEOUS at 06:28

## 2023-12-19 RX ADMIN — LISINOPRIL 20 MILLIGRAM(S): 2.5 TABLET ORAL at 17:51

## 2023-12-19 RX ADMIN — Medication 975 MILLIGRAM(S): at 06:29

## 2023-12-19 RX ADMIN — NEBIVOLOL HYDROCHLORIDE 10 MILLIGRAM(S): 5 TABLET ORAL at 06:28

## 2023-12-19 RX ADMIN — Medication 975 MILLIGRAM(S): at 23:02

## 2023-12-19 RX ADMIN — Medication 200 MILLIGRAM(S): at 06:29

## 2023-12-19 RX ADMIN — TRAMADOL HYDROCHLORIDE 50 MILLIGRAM(S): 50 TABLET ORAL at 08:05

## 2023-12-19 RX ADMIN — Medication 975 MILLIGRAM(S): at 22:02

## 2023-12-19 RX ADMIN — ONDANSETRON 4 MILLIGRAM(S): 8 TABLET, FILM COATED ORAL at 22:02

## 2023-12-19 NOTE — PROGRESS NOTE ADULT - TIME-BASED
Hourly rounds completed. Assessment unchanged. Resting quietly in bed watching TV. Voices no complaints. All needs met at this time. 45

## 2023-12-19 NOTE — PROGRESS NOTE ADULT - TIME BILLING
Discussed treatment plan with patient and family  at bedside. I am a non participating [facility] physician seeing Pt in coverage for Dr Barragan. The above patient documentation by Dr. Mckoy was reviewed and I agree with his evaluation, assessment and treatment plan.  Prabhakar Barragan M.D. Discussed treatment plan with patient and family  at bedside. I am a non participating [facility] physician seeing Pt in coverage for Dr Barragan. The above patient documentation by Dr. Mckoy was reviewed and I agree with his evaluation, assessment and treatment plan.  Prabhakar Barragan M.D.

## 2023-12-19 NOTE — PROGRESS NOTE ADULT - ASSESSMENT
Impression: Stable       Plan:   Continue present treatment                 Out of bed, ambulate, weight bearing as tolerated                 Physical therapy follow up                 Continue to monitor    Steven Suarez PA-C  Orthopaedic Surgery  Team pager [phone]  [phone]                        Impression: Stable       Plan:   Continue present treatment                 Out of bed, ambulate, weight bearing as tolerated                 Physical therapy follow up                 Continue to monitor    Steven Suarez PA-C  Orthopaedic Surgery  Team pager [phone]  [phone]

## 2023-12-19 NOTE — PROGRESS NOTE ADULT - ASSESSMENT
84 yo woman with a hx of HTN and diabetes presents after a fall at home with a right hip fracture. Patient is s/p  an Open reduction and internal fixation of the left hip.

## 2023-12-19 NOTE — PROGRESS NOTE ADULT - SUBJECTIVE AND OBJECTIVE BOX
ORTHO  Patient is a 83y old  Female who presents with a chief complaint of R IT Fx (18 Dec 2023 15:13)    Pt. resting without complaint    VS-  T(C): 36.7 (12-19-23 @ 04:30), Max: 36.8 (12-18-23 @ 20:50)  HR: 89 (12-19-23 @ 04:30) (74 - 89)  BP: 145/63 (12-19-23 @ 04:30) (121/69 - 145/71)  RR: 18 (12-19-23 @ 04:30) (18 - 18)  SpO2: 96% (12-19-23 @ 04:30) (94% - 98%)  Wt(kg): --    M.S. A&O  Extremity- Right LE- dressing- C/D/I changed  Neuro-              Motor- (+)Ankle- DF/PF              Sensation- grossly intact to light touch              Calves- soft, nontender- PAS                               8.5    10.43 )-----------( 245      ( 18 Dec 2023 06:52 )             27.0     12-18    136  |  101  |  29<H>  ----------------------------<  301<H>  4.2   |  22  |  1.10    Ca    8.7      18 Dec 2023 06:48  Mg     1.3     12-17    TPro  6.7  /  Alb  4.1  /  TBili  0.5  /  DBili  x   /  AST  22  /  ALT  19  /  AlkPhos  78  12-17

## 2023-12-19 NOTE — PROGRESS NOTE ADULT - SUBJECTIVE AND OBJECTIVE BOX
82 y/o F PmHx DM, HTN and Hypercholesterolemia presents following fall onto R-side. Patient states she slipped while in the kitchen at 9:30AM. Following the fall c/o R hip pain. Denies head strike, LOC, nausea, vomiting. She denies numbness/tingling b/l lower extremity, bowel and bladder incontinence. The patient was unable to bear weight on her R leg following the fall. Denies upper extremity pain, chest pain, SOB. She was brought to [facility] for further evaluation and treatment. In the ED she was found to have a right hip fracture and is s/p an Open reduction and internal fixation of the left hip. Patient tolerated the procedure well. Patient has started working with physical therapy. Pain has been well managed    MEDICATIONS  (STANDING):  acetaminophen     Tablet .. 975 milliGRAM(s) Oral every 8 hours  aspirin enteric coated 81 milliGRAM(s) Oral daily  atorvastatin 40 milliGRAM(s) Oral at bedtime  dextrose 5%. 1000 milliLiter(s) (100 mL/Hr) IV Continuous <Continuous>  dextrose 5%. 1000 milliLiter(s) (50 mL/Hr) IV Continuous <Continuous>  dextrose 50% Injectable 12.5 Gram(s) IV Push once  dextrose 50% Injectable 25 Gram(s) IV Push once  dextrose 50% Injectable 25 Gram(s) IV Push once  enoxaparin Injectable 40 milliGRAM(s) SubCutaneous every 24 hours  glucagon  Injectable 1 milliGRAM(s) IntraMuscular once  insulin lispro (ADMELOG) corrective regimen sliding scale   SubCutaneous three times a day before meals  insulin lispro (ADMELOG) corrective regimen sliding scale   SubCutaneous at bedtime  lactated ringers. 1000 milliLiter(s) (75 mL/Hr) IV Continuous <Continuous>  latanoprost 0.005% Ophthalmic Solution 1 Drop(s) Both EYES at bedtime  lisinopril 20 milliGRAM(s) Oral two times a day  metFORMIN 500 milliGRAM(s) Oral two times a day  nebivolol 10 milliGRAM(s) Oral daily  ondansetron    Tablet 4 milliGRAM(s) Oral every 8 hours  pantoprazole    Tablet 40 milliGRAM(s) Oral before breakfast  phenazopyridine 200 milliGRAM(s) Oral three times a day  senna 2 Tablet(s) Oral at bedtime  sodium chloride 0.9%. 1000 milliLiter(s) (125 mL/Hr) IV Continuous <Continuous>    MEDICATIONS  (PRN):  dextrose Oral Gel 15 Gram(s) Oral once PRN Blood Glucose LESS THAN 70 milliGRAM(s)/deciliter  oxyCODONE    IR 2.5 milliGRAM(s) Oral every 4 hours PRN Moderate Pain (4 - 6)  oxyCODONE    IR 5 milliGRAM(s) Oral every 4 hours PRN Severe Pain (7 - 10)  polyethylene glycol 3350 17 Gram(s) Oral at bedtime PRN Constipation  traMADol 50 milliGRAM(s) Oral every 4 hours PRN Mild Pain (1 - 3)          VITALS:   T(C): 36.5 (12-19-23 @ 13:10), Max: 36.8 (12-18-23 @ 20:50)  HR: 75 (12-19-23 @ 13:10) (75 - 89)  BP: 113/71 (12-19-23 @ 13:10) (113/71 - 145/63)  RR: 18 (12-19-23 @ 13:10) (18 - 95)  SpO2: 94% (12-19-23 @ 13:10) (94% - 98%)  Wt(kg): --    PHYSICAL EXAM:  GENERAL: NAD, well-groomed, well-developed  HEAD:  Atraumatic, Normocephalic  EYES: EOMI, PERRLA, conjunctiva and sclera clear  ENMT: No tonsillar erythema, exudates, or enlargement; Moist mucous membranes, Good dentition, No lesions  NECK: Supple, No JVD, Normal thyroid  NERVOUS SYSTEM:  Alert & Oriented X3, Good concentration; Motor Strength 5/5 B/L upper and lower extremities; DTRs 2+ intact and symmetric  CHEST/LUNG: Clear to percussion bilaterally; No rales, rhonchi, wheezing, or rubs  HEART: Regular rate and rhythm; No murmurs, rubs, or gallops  ABDOMEN: Soft, Nontender, Nondistended; Bowel sounds present  EXTREMITIES:  2+ Peripheral Pulses, No clubbing, cyanosis, or edema  LYMPH: No lymphadenopathy noted  SKIN: No rashes or lesions    LABS:        CBC Full  -  ( 19 Dec 2023 06:43 )  WBC Count : 9.58 K/uL  RBC Count : 2.89 M/uL  Hemoglobin : 8.1 g/dL  Hematocrit : 24.7 %  Platelet Count - Automated : 212 K/uL  Mean Cell Volume : 85.5 fl  Mean Cell Hemoglobin : 28.0 pg  Mean Cell Hemoglobin Concentration : 32.8 gm/dL  Auto Neutrophil # : x  Auto Lymphocyte # : x  Auto Monocyte # : x  Auto Eosinophil # : x  Auto Basophil # : x  Auto Neutrophil % : x  Auto Lymphocyte % : x  Auto Monocyte % : x  Auto Eosinophil % : x  Auto Basophil % : x    12-19    138  |  103  |  25<H>  ----------------------------<  181<H>  4.2   |  24  |  1.29    Ca    9.3      19 Dec 2023 06:43          Urinalysis Basic - ( 19 Dec 2023 06:43 )    Color: x / Appearance: x / SG: x / pH: x  Gluc: 181 mg/dL / Ketone: x  / Bili: x / Urobili: x   Blood: x / Protein: x / Nitrite: x   Leuk Esterase: x / RBC: x / WBC x   Sq Epi: x / Non Sq Epi: x / Bacteria: x      CAPILLARY BLOOD GLUCOSE      POCT Blood Glucose.: 208 mg/dL (19 Dec 2023 11:58)  POCT Blood Glucose.: 248 mg/dL (19 Dec 2023 08:03)  POCT Blood Glucose.: 233 mg/dL (18 Dec 2023 21:15)  POCT Blood Glucose.: 258 mg/dL (18 Dec 2023 17:07)      RADIOLOGY & ADDITIONAL TESTS:       84 y/o F PmHx DM, HTN and Hypercholesterolemia presents following fall onto R-side. Patient states she slipped while in the kitchen at 9:30AM. Following the fall c/o R hip pain. Denies head strike, LOC, nausea, vomiting. She denies numbness/tingling b/l lower extremity, bowel and bladder incontinence. The patient was unable to bear weight on her R leg following the fall. Denies upper extremity pain, chest pain, SOB. She was brought to [facility] for further evaluation and treatment. In the ED she was found to have a right hip fracture and is s/p an Open reduction and internal fixation of the left hip. Patient tolerated the procedure well. Patient has started working with physical therapy. Pain has been well managed    MEDICATIONS  (STANDING):  acetaminophen     Tablet .. 975 milliGRAM(s) Oral every 8 hours  aspirin enteric coated 81 milliGRAM(s) Oral daily  atorvastatin 40 milliGRAM(s) Oral at bedtime  dextrose 5%. 1000 milliLiter(s) (100 mL/Hr) IV Continuous <Continuous>  dextrose 5%. 1000 milliLiter(s) (50 mL/Hr) IV Continuous <Continuous>  dextrose 50% Injectable 12.5 Gram(s) IV Push once  dextrose 50% Injectable 25 Gram(s) IV Push once  dextrose 50% Injectable 25 Gram(s) IV Push once  enoxaparin Injectable 40 milliGRAM(s) SubCutaneous every 24 hours  glucagon  Injectable 1 milliGRAM(s) IntraMuscular once  insulin lispro (ADMELOG) corrective regimen sliding scale   SubCutaneous three times a day before meals  insulin lispro (ADMELOG) corrective regimen sliding scale   SubCutaneous at bedtime  lactated ringers. 1000 milliLiter(s) (75 mL/Hr) IV Continuous <Continuous>  latanoprost 0.005% Ophthalmic Solution 1 Drop(s) Both EYES at bedtime  lisinopril 20 milliGRAM(s) Oral two times a day  metFORMIN 500 milliGRAM(s) Oral two times a day  nebivolol 10 milliGRAM(s) Oral daily  ondansetron    Tablet 4 milliGRAM(s) Oral every 8 hours  pantoprazole    Tablet 40 milliGRAM(s) Oral before breakfast  phenazopyridine 200 milliGRAM(s) Oral three times a day  senna 2 Tablet(s) Oral at bedtime  sodium chloride 0.9%. 1000 milliLiter(s) (125 mL/Hr) IV Continuous <Continuous>    MEDICATIONS  (PRN):  dextrose Oral Gel 15 Gram(s) Oral once PRN Blood Glucose LESS THAN 70 milliGRAM(s)/deciliter  oxyCODONE    IR 2.5 milliGRAM(s) Oral every 4 hours PRN Moderate Pain (4 - 6)  oxyCODONE    IR 5 milliGRAM(s) Oral every 4 hours PRN Severe Pain (7 - 10)  polyethylene glycol 3350 17 Gram(s) Oral at bedtime PRN Constipation  traMADol 50 milliGRAM(s) Oral every 4 hours PRN Mild Pain (1 - 3)          VITALS:   T(C): 36.5 (12-19-23 @ 13:10), Max: 36.8 (12-18-23 @ 20:50)  HR: 75 (12-19-23 @ 13:10) (75 - 89)  BP: 113/71 (12-19-23 @ 13:10) (113/71 - 145/63)  RR: 18 (12-19-23 @ 13:10) (18 - 95)  SpO2: 94% (12-19-23 @ 13:10) (94% - 98%)  Wt(kg): --    PHYSICAL EXAM:  GENERAL: NAD, well-groomed, well-developed  HEAD:  Atraumatic, Normocephalic  EYES: EOMI, PERRLA, conjunctiva and sclera clear  ENMT: No tonsillar erythema, exudates, or enlargement; Moist mucous membranes, Good dentition, No lesions  NECK: Supple, No JVD, Normal thyroid  NERVOUS SYSTEM:  Alert & Oriented X3, Good concentration; Motor Strength 5/5 B/L upper and lower extremities; DTRs 2+ intact and symmetric  CHEST/LUNG: Clear to percussion bilaterally; No rales, rhonchi, wheezing, or rubs  HEART: Regular rate and rhythm; No murmurs, rubs, or gallops  ABDOMEN: Soft, Nontender, Nondistended; Bowel sounds present  EXTREMITIES:  2+ Peripheral Pulses, No clubbing, cyanosis, or edema  LYMPH: No lymphadenopathy noted  SKIN: No rashes or lesions    LABS:        CBC Full  -  ( 19 Dec 2023 06:43 )  WBC Count : 9.58 K/uL  RBC Count : 2.89 M/uL  Hemoglobin : 8.1 g/dL  Hematocrit : 24.7 %  Platelet Count - Automated : 212 K/uL  Mean Cell Volume : 85.5 fl  Mean Cell Hemoglobin : 28.0 pg  Mean Cell Hemoglobin Concentration : 32.8 gm/dL  Auto Neutrophil # : x  Auto Lymphocyte # : x  Auto Monocyte # : x  Auto Eosinophil # : x  Auto Basophil # : x  Auto Neutrophil % : x  Auto Lymphocyte % : x  Auto Monocyte % : x  Auto Eosinophil % : x  Auto Basophil % : x    12-19    138  |  103  |  25<H>  ----------------------------<  181<H>  4.2   |  24  |  1.29    Ca    9.3      19 Dec 2023 06:43          Urinalysis Basic - ( 19 Dec 2023 06:43 )    Color: x / Appearance: x / SG: x / pH: x  Gluc: 181 mg/dL / Ketone: x  / Bili: x / Urobili: x   Blood: x / Protein: x / Nitrite: x   Leuk Esterase: x / RBC: x / WBC x   Sq Epi: x / Non Sq Epi: x / Bacteria: x      CAPILLARY BLOOD GLUCOSE      POCT Blood Glucose.: 208 mg/dL (19 Dec 2023 11:58)  POCT Blood Glucose.: 248 mg/dL (19 Dec 2023 08:03)  POCT Blood Glucose.: 233 mg/dL (18 Dec 2023 21:15)  POCT Blood Glucose.: 258 mg/dL (18 Dec 2023 17:07)      RADIOLOGY & ADDITIONAL TESTS:

## 2023-12-20 ENCOUNTER — TRANSCRIPTION ENCOUNTER (OUTPATIENT)
Age: 83
End: 2023-12-20

## 2023-12-20 VITALS
OXYGEN SATURATION: 94 % | SYSTOLIC BLOOD PRESSURE: 109 MMHG | HEART RATE: 57 BPM | DIASTOLIC BLOOD PRESSURE: 63 MMHG | TEMPERATURE: 98 F | RESPIRATION RATE: 18 BRPM

## 2023-12-20 LAB
ANION GAP SERPL CALC-SCNC: 14 MMOL/L — SIGNIFICANT CHANGE UP (ref 5–17)
ANION GAP SERPL CALC-SCNC: 14 MMOL/L — SIGNIFICANT CHANGE UP (ref 5–17)
BUN SERPL-MCNC: 38 MG/DL — HIGH (ref 7–23)
BUN SERPL-MCNC: 38 MG/DL — HIGH (ref 7–23)
CALCIUM SERPL-MCNC: 9.5 MG/DL — SIGNIFICANT CHANGE UP (ref 8.4–10.5)
CALCIUM SERPL-MCNC: 9.5 MG/DL — SIGNIFICANT CHANGE UP (ref 8.4–10.5)
CHLORIDE SERPL-SCNC: 103 MMOL/L — SIGNIFICANT CHANGE UP (ref 96–108)
CHLORIDE SERPL-SCNC: 103 MMOL/L — SIGNIFICANT CHANGE UP (ref 96–108)
CO2 SERPL-SCNC: 23 MMOL/L — SIGNIFICANT CHANGE UP (ref 22–31)
CO2 SERPL-SCNC: 23 MMOL/L — SIGNIFICANT CHANGE UP (ref 22–31)
CREAT SERPL-MCNC: 1.56 MG/DL — HIGH (ref 0.5–1.3)
CREAT SERPL-MCNC: 1.56 MG/DL — HIGH (ref 0.5–1.3)
EGFR: 33 ML/MIN/1.73M2 — LOW
EGFR: 33 ML/MIN/1.73M2 — LOW
GLUCOSE BLDC GLUCOMTR-MCNC: 172 MG/DL — HIGH (ref 70–99)
GLUCOSE BLDC GLUCOMTR-MCNC: 172 MG/DL — HIGH (ref 70–99)
GLUCOSE BLDC GLUCOMTR-MCNC: 184 MG/DL — HIGH (ref 70–99)
GLUCOSE BLDC GLUCOMTR-MCNC: 184 MG/DL — HIGH (ref 70–99)
GLUCOSE BLDC GLUCOMTR-MCNC: 207 MG/DL — HIGH (ref 70–99)
GLUCOSE BLDC GLUCOMTR-MCNC: 207 MG/DL — HIGH (ref 70–99)
GLUCOSE SERPL-MCNC: 138 MG/DL — HIGH (ref 70–99)
GLUCOSE SERPL-MCNC: 138 MG/DL — HIGH (ref 70–99)
HCT VFR BLD CALC: 25.3 % — LOW (ref 34.5–45)
HCT VFR BLD CALC: 25.3 % — LOW (ref 34.5–45)
HGB BLD-MCNC: 8 G/DL — LOW (ref 11.5–15.5)
HGB BLD-MCNC: 8 G/DL — LOW (ref 11.5–15.5)
MCHC RBC-ENTMCNC: 27.9 PG — SIGNIFICANT CHANGE UP (ref 27–34)
MCHC RBC-ENTMCNC: 27.9 PG — SIGNIFICANT CHANGE UP (ref 27–34)
MCHC RBC-ENTMCNC: 31.6 GM/DL — LOW (ref 32–36)
MCHC RBC-ENTMCNC: 31.6 GM/DL — LOW (ref 32–36)
MCV RBC AUTO: 88.2 FL — SIGNIFICANT CHANGE UP (ref 80–100)
MCV RBC AUTO: 88.2 FL — SIGNIFICANT CHANGE UP (ref 80–100)
NRBC # BLD: 0 /100 WBCS — SIGNIFICANT CHANGE UP (ref 0–0)
NRBC # BLD: 0 /100 WBCS — SIGNIFICANT CHANGE UP (ref 0–0)
PLATELET # BLD AUTO: 201 K/UL — SIGNIFICANT CHANGE UP (ref 150–400)
PLATELET # BLD AUTO: 201 K/UL — SIGNIFICANT CHANGE UP (ref 150–400)
POTASSIUM SERPL-MCNC: 4.2 MMOL/L — SIGNIFICANT CHANGE UP (ref 3.5–5.3)
POTASSIUM SERPL-MCNC: 4.2 MMOL/L — SIGNIFICANT CHANGE UP (ref 3.5–5.3)
POTASSIUM SERPL-SCNC: 4.2 MMOL/L — SIGNIFICANT CHANGE UP (ref 3.5–5.3)
POTASSIUM SERPL-SCNC: 4.2 MMOL/L — SIGNIFICANT CHANGE UP (ref 3.5–5.3)
RBC # BLD: 2.87 M/UL — LOW (ref 3.8–5.2)
RBC # BLD: 2.87 M/UL — LOW (ref 3.8–5.2)
RBC # FLD: 14.1 % — SIGNIFICANT CHANGE UP (ref 10.3–14.5)
RBC # FLD: 14.1 % — SIGNIFICANT CHANGE UP (ref 10.3–14.5)
SARS-COV-2 RNA SPEC QL NAA+PROBE: SIGNIFICANT CHANGE UP
SARS-COV-2 RNA SPEC QL NAA+PROBE: SIGNIFICANT CHANGE UP
SODIUM SERPL-SCNC: 140 MMOL/L — SIGNIFICANT CHANGE UP (ref 135–145)
SODIUM SERPL-SCNC: 140 MMOL/L — SIGNIFICANT CHANGE UP (ref 135–145)
WBC # BLD: 9.03 K/UL — SIGNIFICANT CHANGE UP (ref 3.8–10.5)
WBC # BLD: 9.03 K/UL — SIGNIFICANT CHANGE UP (ref 3.8–10.5)
WBC # FLD AUTO: 9.03 K/UL — SIGNIFICANT CHANGE UP (ref 3.8–10.5)
WBC # FLD AUTO: 9.03 K/UL — SIGNIFICANT CHANGE UP (ref 3.8–10.5)

## 2023-12-20 PROCEDURE — 73552 X-RAY EXAM OF FEMUR 2/>: CPT

## 2023-12-20 PROCEDURE — 71045 X-RAY EXAM CHEST 1 VIEW: CPT

## 2023-12-20 PROCEDURE — 72170 X-RAY EXAM OF PELVIS: CPT

## 2023-12-20 PROCEDURE — 85610 PROTHROMBIN TIME: CPT

## 2023-12-20 PROCEDURE — 82962 GLUCOSE BLOOD TEST: CPT

## 2023-12-20 PROCEDURE — 85730 THROMBOPLASTIN TIME PARTIAL: CPT

## 2023-12-20 PROCEDURE — 73502 X-RAY EXAM HIP UNI 2-3 VIEWS: CPT

## 2023-12-20 PROCEDURE — 73562 X-RAY EXAM OF KNEE 3: CPT

## 2023-12-20 PROCEDURE — 99285 EMERGENCY DEPT VISIT HI MDM: CPT | Mod: 25

## 2023-12-20 PROCEDURE — C1713: CPT

## 2023-12-20 PROCEDURE — 87635 SARS-COV-2 COVID-19 AMP PRB: CPT

## 2023-12-20 PROCEDURE — 80048 BASIC METABOLIC PNL TOTAL CA: CPT

## 2023-12-20 PROCEDURE — 83735 ASSAY OF MAGNESIUM: CPT

## 2023-12-20 PROCEDURE — 86901 BLOOD TYPING SEROLOGIC RH(D): CPT

## 2023-12-20 PROCEDURE — 85027 COMPLETE CBC AUTOMATED: CPT

## 2023-12-20 PROCEDURE — 86900 BLOOD TYPING SEROLOGIC ABO: CPT

## 2023-12-20 PROCEDURE — 97110 THERAPEUTIC EXERCISES: CPT

## 2023-12-20 PROCEDURE — 70450 CT HEAD/BRAIN W/O DYE: CPT | Mod: MA

## 2023-12-20 PROCEDURE — 80053 COMPREHEN METABOLIC PANEL: CPT

## 2023-12-20 PROCEDURE — 36415 COLL VENOUS BLD VENIPUNCTURE: CPT

## 2023-12-20 PROCEDURE — 96374 THER/PROPH/DIAG INJ IV PUSH: CPT

## 2023-12-20 PROCEDURE — 93005 ELECTROCARDIOGRAM TRACING: CPT

## 2023-12-20 PROCEDURE — 86850 RBC ANTIBODY SCREEN: CPT

## 2023-12-20 PROCEDURE — 97116 GAIT TRAINING THERAPY: CPT

## 2023-12-20 PROCEDURE — 97165 OT EVAL LOW COMPLEX 30 MIN: CPT

## 2023-12-20 PROCEDURE — 97161 PT EVAL LOW COMPLEX 20 MIN: CPT

## 2023-12-20 PROCEDURE — 76000 FLUOROSCOPY <1 HR PHYS/QHP: CPT

## 2023-12-20 PROCEDURE — 83036 HEMOGLOBIN GLYCOSYLATED A1C: CPT

## 2023-12-20 PROCEDURE — 85025 COMPLETE CBC W/AUTO DIFF WBC: CPT

## 2023-12-20 RX ORDER — OXYCODONE HYDROCHLORIDE 5 MG/1
1 TABLET ORAL
Qty: 0 | Refills: 0 | DISCHARGE
Start: 2023-12-20

## 2023-12-20 RX ORDER — ACETAMINOPHEN 500 MG
3 TABLET ORAL
Qty: 0 | Refills: 0 | DISCHARGE
Start: 2023-12-20

## 2023-12-20 RX ORDER — ENOXAPARIN SODIUM 100 MG/ML
1 INJECTION SUBCUTANEOUS
Qty: 0 | Refills: 0 | DISCHARGE
Start: 2023-12-20

## 2023-12-20 RX ORDER — LATANOPROST 0.05 MG/ML
1 SOLUTION/ DROPS OPHTHALMIC; TOPICAL
Qty: 0 | Refills: 0 | DISCHARGE
Start: 2023-12-20

## 2023-12-20 RX ORDER — PANTOPRAZOLE SODIUM 20 MG/1
1 TABLET, DELAYED RELEASE ORAL
Qty: 0 | Refills: 0 | DISCHARGE
Start: 2023-12-20

## 2023-12-20 RX ORDER — TRAMADOL HYDROCHLORIDE 50 MG/1
1 TABLET ORAL
Qty: 0 | Refills: 0 | DISCHARGE
Start: 2023-12-20

## 2023-12-20 RX ADMIN — Medication 81 MILLIGRAM(S): at 09:20

## 2023-12-20 RX ADMIN — LISINOPRIL 20 MILLIGRAM(S): 2.5 TABLET ORAL at 17:23

## 2023-12-20 RX ADMIN — Medication 1: at 13:30

## 2023-12-20 RX ADMIN — Medication 975 MILLIGRAM(S): at 05:09

## 2023-12-20 RX ADMIN — ENOXAPARIN SODIUM 40 MILLIGRAM(S): 100 INJECTION SUBCUTANEOUS at 05:08

## 2023-12-20 RX ADMIN — Medication 200 MILLIGRAM(S): at 13:28

## 2023-12-20 RX ADMIN — Medication 2: at 17:22

## 2023-12-20 RX ADMIN — NEBIVOLOL HYDROCHLORIDE 10 MILLIGRAM(S): 5 TABLET ORAL at 05:09

## 2023-12-20 RX ADMIN — Medication 975 MILLIGRAM(S): at 06:09

## 2023-12-20 RX ADMIN — Medication 975 MILLIGRAM(S): at 14:28

## 2023-12-20 RX ADMIN — METFORMIN HYDROCHLORIDE 500 MILLIGRAM(S): 850 TABLET ORAL at 05:09

## 2023-12-20 RX ADMIN — ONDANSETRON 4 MILLIGRAM(S): 8 TABLET, FILM COATED ORAL at 13:28

## 2023-12-20 RX ADMIN — ONDANSETRON 4 MILLIGRAM(S): 8 TABLET, FILM COATED ORAL at 05:09

## 2023-12-20 RX ADMIN — Medication 1: at 09:20

## 2023-12-20 RX ADMIN — LISINOPRIL 20 MILLIGRAM(S): 2.5 TABLET ORAL at 05:09

## 2023-12-20 RX ADMIN — PANTOPRAZOLE SODIUM 40 MILLIGRAM(S): 20 TABLET, DELAYED RELEASE ORAL at 05:10

## 2023-12-20 RX ADMIN — Medication 200 MILLIGRAM(S): at 05:09

## 2023-12-20 RX ADMIN — Medication 975 MILLIGRAM(S): at 13:28

## 2023-12-20 RX ADMIN — METFORMIN HYDROCHLORIDE 500 MILLIGRAM(S): 850 TABLET ORAL at 17:23

## 2023-12-20 NOTE — PROGRESS NOTE ADULT - PROBLEM SELECTOR PLAN 1
Patient is s/p an Open reduction and internal fixation of the right hip  Patient tolerated the procedure well  physical therapy as tolerated  continue DVT and GI prophylaxis  PO as tolerated
Patient is s/p an Open reduction and internal fixation of the right hip  Patient tolerated the procedure well  physical therapy as tolerated  continue DVT and GI prophylaxis  PO as tolerated
Patient is s/p an Open reduction and internal fixation of the right hip  Patient tolerated the procedure well  physical therapy as tolerated  continue DVT and GI prophylaxis

## 2023-12-20 NOTE — PROGRESS NOTE ADULT - PROBLEM SELECTOR PLAN 3
continue to monitor glucose  Metformin restarted BID   continue insulin sliding scale  Endo  follow up as needed.

## 2023-12-20 NOTE — DISCHARGE NOTE NURSING/CASE MANAGEMENT/SOCIAL WORK - PATIENT PORTAL LINK FT
You can access the FollowMyHealth Patient Portal offered by [facility] by registering at the following website: [email or web address]. By joining [employer]’s FollowMyHealth portal, you will also be able to view your health information using other applications (apps) compatible with our system. You can access the FollowMyHealth Patient Portal offered by [facility] by registering at the following website: [email or web address]. By joining [employer]’s FollowMyHealth portal, you will also be able to view your health information using other applications (apps) compatible with our system.

## 2023-12-20 NOTE — PROGRESS NOTE ADULT - SUBJECTIVE AND OBJECTIVE BOX
82 y/o F PmHx DM, HTN and Hypercholesterolemia presents following fall onto R-side. Patient states she slipped while in the kitchen at 9:30AM. Following the fall c/o R hip pain. Denies head strike, LOC, nausea, vomiting. She denies numbness/tingling b/l lower extremity, bowel and bladder incontinence. The patient was unable to bear weight on her R leg following the fall. Denies upper extremity pain, chest pain, SOB. She was brought to [facility] for further evaluation and treatment. In the ED she was found to have a right hip fracture and is s/p an Open reduction and internal fixation of the left hip. Patient tolerated the procedure well. Patient has started working with physical therapy. Pain has been well managed. Seen OOB sitting in a chair      MEDICATIONS  (STANDING):  acetaminophen     Tablet .. 975 milliGRAM(s) Oral every 8 hours  aspirin enteric coated 81 milliGRAM(s) Oral daily  atorvastatin 40 milliGRAM(s) Oral at bedtime  dextrose 5%. 1000 milliLiter(s) (50 mL/Hr) IV Continuous <Continuous>  dextrose 5%. 1000 milliLiter(s) (100 mL/Hr) IV Continuous <Continuous>  dextrose 50% Injectable 25 Gram(s) IV Push once  dextrose 50% Injectable 12.5 Gram(s) IV Push once  dextrose 50% Injectable 25 Gram(s) IV Push once  enoxaparin Injectable 40 milliGRAM(s) SubCutaneous every 24 hours  glucagon  Injectable 1 milliGRAM(s) IntraMuscular once  insulin lispro (ADMELOG) corrective regimen sliding scale   SubCutaneous three times a day before meals  insulin lispro (ADMELOG) corrective regimen sliding scale   SubCutaneous at bedtime  lactated ringers. 1000 milliLiter(s) (75 mL/Hr) IV Continuous <Continuous>  latanoprost 0.005% Ophthalmic Solution 1 Drop(s) Both EYES at bedtime  lisinopril 20 milliGRAM(s) Oral two times a day  metFORMIN 500 milliGRAM(s) Oral two times a day  nebivolol 10 milliGRAM(s) Oral daily  ondansetron    Tablet 4 milliGRAM(s) Oral every 8 hours  pantoprazole    Tablet 40 milliGRAM(s) Oral before breakfast  phenazopyridine 200 milliGRAM(s) Oral three times a day  senna 2 Tablet(s) Oral at bedtime  sodium chloride 0.9%. 1000 milliLiter(s) (125 mL/Hr) IV Continuous <Continuous>    MEDICATIONS  (PRN):  dextrose Oral Gel 15 Gram(s) Oral once PRN Blood Glucose LESS THAN 70 milliGRAM(s)/deciliter  oxyCODONE    IR 2.5 milliGRAM(s) Oral every 4 hours PRN Moderate Pain (4 - 6)  oxyCODONE    IR 5 milliGRAM(s) Oral every 4 hours PRN Severe Pain (7 - 10)  polyethylene glycol 3350 17 Gram(s) Oral at bedtime PRN Constipation  traMADol 50 milliGRAM(s) Oral every 4 hours PRN Mild Pain (1 - 3)          VITALS:   T(C): 36.6 (12-20-23 @ 13:10), Max: 36.6 (12-20-23 @ 04:20)  HR: 69 (12-20-23 @ 13:10) (69 - 86)  BP: 110/65 (12-20-23 @ 13:10) (96/61 - 148/72)  RR: 18 (12-20-23 @ 13:10) (18 - 18)  SpO2: 94% (12-20-23 @ 13:10) (93% - 99%)  Wt(kg): --    PHYSICAL EXAM:  GENERAL: NAD, well-groomed, well-developed  HEAD:  Atraumatic, Normocephalic  EYES: EOMI, PERRLA, conjunctiva and sclera clear  ENMT: No tonsillar erythema, exudates, or enlargement; Moist mucous membranes, Good dentition, No lesions  NECK: Supple, No JVD, Normal thyroid  NERVOUS SYSTEM:  Alert & Oriented X3, Good concentration; Motor Strength 5/5 B/L upper and lower extremities; DTRs 2+ intact and symmetric  CHEST/LUNG: Clear to percussion bilaterally; No rales, rhonchi, wheezing, or rubs  HEART: Regular rate and rhythm; No murmurs, rubs, or gallops  ABDOMEN: Soft, Nontender, Nondistended; Bowel sounds present  EXTREMITIES:  2+ Peripheral Pulses, No clubbing, cyanosis, or edema  LYMPH: No lymphadenopathy noted  SKIN: No rashes or lesions      LABS:        CBC Full  -  ( 20 Dec 2023 07:11 )  WBC Count : 9.03 K/uL  RBC Count : 2.87 M/uL  Hemoglobin : 8.0 g/dL  Hematocrit : 25.3 %  Platelet Count - Automated : 201 K/uL  Mean Cell Volume : 88.2 fl  Mean Cell Hemoglobin : 27.9 pg  Mean Cell Hemoglobin Concentration : 31.6 gm/dL  Auto Neutrophil # : x  Auto Lymphocyte # : x  Auto Monocyte # : x  Auto Eosinophil # : x  Auto Basophil # : x  Auto Neutrophil % : x  Auto Lymphocyte % : x  Auto Monocyte % : x  Auto Eosinophil % : x  Auto Basophil % : x    12-20    140  |  103  |  38<H>  ----------------------------<  138<H>  4.2   |  23  |  1.56<H>    Ca    9.5      20 Dec 2023 07:08          Urinalysis Basic - ( 20 Dec 2023 07:08 )    Color: x / Appearance: x / SG: x / pH: x  Gluc: 138 mg/dL / Ketone: x  / Bili: x / Urobili: x   Blood: x / Protein: x / Nitrite: x   Leuk Esterase: x / RBC: x / WBC x   Sq Epi: x / Non Sq Epi: x / Bacteria: x      CAPILLARY BLOOD GLUCOSE      POCT Blood Glucose.: 184 mg/dL (20 Dec 2023 13:01)  POCT Blood Glucose.: 172 mg/dL (20 Dec 2023 08:52)  POCT Blood Glucose.: 167 mg/dL (19 Dec 2023 21:51)  POCT Blood Glucose.: 192 mg/dL (19 Dec 2023 17:54)      RADIOLOGY & ADDITIONAL TESTS:       82 y/o F PmHx DM, HTN and Hypercholesterolemia presents following fall onto R-side. Patient states she slipped while in the kitchen at 9:30AM. Following the fall c/o R hip pain. Denies head strike, LOC, nausea, vomiting. She denies numbness/tingling b/l lower extremity, bowel and bladder incontinence. The patient was unable to bear weight on her R leg following the fall. Denies upper extremity pain, chest pain, SOB. She was brought to [facility] for further evaluation and treatment. In the ED she was found to have a right hip fracture and is s/p an Open reduction and internal fixation of the left hip. Patient tolerated the procedure well. Patient has started working with physical therapy. Pain has been well managed. Seen OOB sitting in a chair      MEDICATIONS  (STANDING):  acetaminophen     Tablet .. 975 milliGRAM(s) Oral every 8 hours  aspirin enteric coated 81 milliGRAM(s) Oral daily  atorvastatin 40 milliGRAM(s) Oral at bedtime  dextrose 5%. 1000 milliLiter(s) (50 mL/Hr) IV Continuous <Continuous>  dextrose 5%. 1000 milliLiter(s) (100 mL/Hr) IV Continuous <Continuous>  dextrose 50% Injectable 25 Gram(s) IV Push once  dextrose 50% Injectable 12.5 Gram(s) IV Push once  dextrose 50% Injectable 25 Gram(s) IV Push once  enoxaparin Injectable 40 milliGRAM(s) SubCutaneous every 24 hours  glucagon  Injectable 1 milliGRAM(s) IntraMuscular once  insulin lispro (ADMELOG) corrective regimen sliding scale   SubCutaneous three times a day before meals  insulin lispro (ADMELOG) corrective regimen sliding scale   SubCutaneous at bedtime  lactated ringers. 1000 milliLiter(s) (75 mL/Hr) IV Continuous <Continuous>  latanoprost 0.005% Ophthalmic Solution 1 Drop(s) Both EYES at bedtime  lisinopril 20 milliGRAM(s) Oral two times a day  metFORMIN 500 milliGRAM(s) Oral two times a day  nebivolol 10 milliGRAM(s) Oral daily  ondansetron    Tablet 4 milliGRAM(s) Oral every 8 hours  pantoprazole    Tablet 40 milliGRAM(s) Oral before breakfast  phenazopyridine 200 milliGRAM(s) Oral three times a day  senna 2 Tablet(s) Oral at bedtime  sodium chloride 0.9%. 1000 milliLiter(s) (125 mL/Hr) IV Continuous <Continuous>    MEDICATIONS  (PRN):  dextrose Oral Gel 15 Gram(s) Oral once PRN Blood Glucose LESS THAN 70 milliGRAM(s)/deciliter  oxyCODONE    IR 2.5 milliGRAM(s) Oral every 4 hours PRN Moderate Pain (4 - 6)  oxyCODONE    IR 5 milliGRAM(s) Oral every 4 hours PRN Severe Pain (7 - 10)  polyethylene glycol 3350 17 Gram(s) Oral at bedtime PRN Constipation  traMADol 50 milliGRAM(s) Oral every 4 hours PRN Mild Pain (1 - 3)          VITALS:   T(C): 36.6 (12-20-23 @ 13:10), Max: 36.6 (12-20-23 @ 04:20)  HR: 69 (12-20-23 @ 13:10) (69 - 86)  BP: 110/65 (12-20-23 @ 13:10) (96/61 - 148/72)  RR: 18 (12-20-23 @ 13:10) (18 - 18)  SpO2: 94% (12-20-23 @ 13:10) (93% - 99%)  Wt(kg): --    PHYSICAL EXAM:  GENERAL: NAD, well-groomed, well-developed  HEAD:  Atraumatic, Normocephalic  EYES: EOMI, PERRLA, conjunctiva and sclera clear  ENMT: No tonsillar erythema, exudates, or enlargement; Moist mucous membranes, Good dentition, No lesions  NECK: Supple, No JVD, Normal thyroid  NERVOUS SYSTEM:  Alert & Oriented X3, Good concentration; Motor Strength 5/5 B/L upper and lower extremities; DTRs 2+ intact and symmetric  CHEST/LUNG: Clear to percussion bilaterally; No rales, rhonchi, wheezing, or rubs  HEART: Regular rate and rhythm; No murmurs, rubs, or gallops  ABDOMEN: Soft, Nontender, Nondistended; Bowel sounds present  EXTREMITIES:  2+ Peripheral Pulses, No clubbing, cyanosis, or edema  LYMPH: No lymphadenopathy noted  SKIN: No rashes or lesions      LABS:        CBC Full  -  ( 20 Dec 2023 07:11 )  WBC Count : 9.03 K/uL  RBC Count : 2.87 M/uL  Hemoglobin : 8.0 g/dL  Hematocrit : 25.3 %  Platelet Count - Automated : 201 K/uL  Mean Cell Volume : 88.2 fl  Mean Cell Hemoglobin : 27.9 pg  Mean Cell Hemoglobin Concentration : 31.6 gm/dL  Auto Neutrophil # : x  Auto Lymphocyte # : x  Auto Monocyte # : x  Auto Eosinophil # : x  Auto Basophil # : x  Auto Neutrophil % : x  Auto Lymphocyte % : x  Auto Monocyte % : x  Auto Eosinophil % : x  Auto Basophil % : x    12-20    140  |  103  |  38<H>  ----------------------------<  138<H>  4.2   |  23  |  1.56<H>    Ca    9.5      20 Dec 2023 07:08          Urinalysis Basic - ( 20 Dec 2023 07:08 )    Color: x / Appearance: x / SG: x / pH: x  Gluc: 138 mg/dL / Ketone: x  / Bili: x / Urobili: x   Blood: x / Protein: x / Nitrite: x   Leuk Esterase: x / RBC: x / WBC x   Sq Epi: x / Non Sq Epi: x / Bacteria: x      CAPILLARY BLOOD GLUCOSE      POCT Blood Glucose.: 184 mg/dL (20 Dec 2023 13:01)  POCT Blood Glucose.: 172 mg/dL (20 Dec 2023 08:52)  POCT Blood Glucose.: 167 mg/dL (19 Dec 2023 21:51)  POCT Blood Glucose.: 192 mg/dL (19 Dec 2023 17:54)      RADIOLOGY & ADDITIONAL TESTS:

## 2023-12-20 NOTE — PROGRESS NOTE ADULT - PROBLEM SELECTOR PLAN 2
continue lisinopril  continue to monitor BP  will adjust meds as needed  Low sodium diet.
continue lisinopril  continue to monitor BP  will adjust meds as needed  Low sodium diet.  BP has been well controlled
continue lisinopril  continue to monitor BP  will adjust meds as needed  Low sodium diet.

## 2023-12-20 NOTE — PROGRESS NOTE ADULT - PROVIDER SPECIALTY LIST ADULT
Orthopedics
Internal Medicine

## 2023-12-20 NOTE — PROGRESS NOTE ADULT - TIME BILLING
Discussed treatment plan with patient and family  at bedside. I am a non participating [facility] physician seeing Pt in coverage for Dr Barragan. The above patient documentation by Dr. Mckoy was reviewed and I agree with his evaluation, assessment and treatment plan.  Prabhakar Barragan M.D. Discussed treatment plan with patient and family  at bedside. I am a non participating [facility] physician seeing Pt in coverage for Dr Barragan. The above patient documentation by Dr. Mckoy was reviewed and I agree with his evaluation, assessment and treatment plan.  Prabhakar Barragan M.D.

## 2023-12-20 NOTE — DISCHARGE NOTE NURSING/CASE MANAGEMENT/SOCIAL WORK - NSDCPEFALRISK_GEN_ALL_CORE
For information on Fall & Injury Prevention, visit: [email or web address] OR  [email or web address] OR  https://www.cdc.gov/steadi/patient.html For information on Fall & Injury Prevention, visit: [email or web address] OR  [email or web address] OR  https://www.cdc.gov/steadi/patient.html

## 2023-12-20 NOTE — PROGRESS NOTE ADULT - SUBJECTIVE AND OBJECTIVE BOX
Patient comfortable  No complaints    T(C): 36.6 (12-20-23 @ 04:20), Max: 36.6 (12-20-23 @ 04:20)  HR: 80 (12-20-23 @ 04:20) (71 - 86)  BP: 148/72 (12-20-23 @ 04:20) (111/68 - 148/72)  RR: 18 (12-20-23 @ 04:20) (18 - 18)  SpO2: 94% (12-20-23 @ 04:20) (94% - 99%)      PHYSICAL EXAM:  NAD, Alert, follows commands  Ext: RLE: Hip dsgs removed, incisions healing well w prineo in place; dull sensation grossly intact to light touch; (+) DF/PF; (+) Distal Pulses; No Calf tenderness B/L, PAS       LABS:                        8.0    9.03  )-----------( 201      ( 20 Dec 2023 07:11 )             25.3     12-19    138  |  103  |  25<H>  ----------------------------<  181<H>  4.2   |  24  |  1.29    Ca    9.3      19 Dec 2023 06:43

## 2023-12-20 NOTE — PROGRESS NOTE ADULT - ASSESSMENT
A/P: 82 y/o F POD# 3 s/p R IM Nail    DVT ppx- Lovenox  RLE WBAT  PT  OT  Pain management prn  Gi ppx  FU AM labs  Discharge planning to [facility]  Orthopedic Surgery  [id number]   A/P: 82 y/o F POD# 3 s/p R IM Nail    DVT ppx- Lovenox  RLE WBAT  PT  OT  Pain management prn  Gi ppx  FU AM labs  Discharge planning to [facility]  Orthopedic Surgery  [phone]

## 2024-01-04 PROBLEM — Z00.00 ENCOUNTER FOR PREVENTIVE HEALTH EXAMINATION: Status: ACTIVE | Noted: 2024-01-04

## 2024-01-05 ENCOUNTER — APPOINTMENT (OUTPATIENT)
Dept: ORTHOPEDIC SURGERY | Facility: CLINIC | Age: 84
End: 2024-01-05
Payer: MEDICARE

## 2024-01-05 VITALS — HEIGHT: 63 IN | BODY MASS INDEX: 21.26 KG/M2 | WEIGHT: 120 LBS

## 2024-01-05 PROCEDURE — 99024 POSTOP FOLLOW-UP VISIT: CPT

## 2024-01-05 PROCEDURE — 73552 X-RAY EXAM OF FEMUR 2/>: CPT | Mod: RT,TC

## 2024-01-05 NOTE — HISTORY OF PRESENT ILLNESS
[de-identified] : 2 weeks status post intramedullary nailing of right intertrochanteric femur fracture [de-identified] : Overall patient is doing well.  She is currently in rehab facility.  She was progressing well with physical therapy.  Denies any wound issues [de-identified] : Right lower extremity  -patient able to stand and take a couple steps without pain - wound healing appropriately  - Pain-free range of motion of the hip - Sensation intact light touch distally - Leg warm well-perfused - Patient able to flex and extend knee dorsiflex proximal ankle and toes [de-identified] : 2 views of the right femur obtained today my interpretation: Hardware in place no callus formation at this time.  No evidence of hardware failure [de-identified] : 2 weeks status post intramedullary nailing of right intertrochanteric femur fracture - Patient progressing well wounds healing appropriately - X-rays show hardware in place no evidence of failure - Patient should continue to be weight-bear as tolerated to right lower extremity -patient should continue physical therapy  working on gait training and lower extremity strengthening. - Patient should follow-up in 4 weeks we will get repeat x-rays at that time.

## 2024-02-06 ENCOUNTER — APPOINTMENT (OUTPATIENT)
Dept: ORTHOPEDIC SURGERY | Facility: CLINIC | Age: 84
End: 2024-02-06
Payer: MEDICARE

## 2024-02-06 VITALS
BODY MASS INDEX: 21.26 KG/M2 | HEIGHT: 63 IN | HEART RATE: 85 BPM | WEIGHT: 120 LBS | DIASTOLIC BLOOD PRESSURE: 71 MMHG | SYSTOLIC BLOOD PRESSURE: 165 MMHG

## 2024-02-06 PROCEDURE — 99024 POSTOP FOLLOW-UP VISIT: CPT

## 2024-02-06 PROCEDURE — 73552 X-RAY EXAM OF FEMUR 2/>: CPT | Mod: RT

## 2024-02-06 NOTE — HISTORY OF PRESENT ILLNESS
[de-identified] : 6 weeks status post intramedullary nailing of right intertrochanteric femur fracture [de-identified] : Overall patient is doing well.  She is now at home, doing home PT. doing well, using walker for ambulation [de-identified] : Right lower extremity  -patient able to stand and walk with walker - wound healed - Pain-free range of motion of the hip - Sensation intact light touch distally - Leg warm well-perfused - Patient able to flex and extend knee dorsiflex proximal ankle and toes [de-identified] : 2 views of the right femur obtained today my interpretation: Hardware in place, bony healing evident.  No evidence of hardware failure [de-identified] : 6 weeks status post intramedullary nailing of right intertrochanteric femur fracture - Patient progressing well wounds healing appropriately - X-rays show hardware in place no evidence of failure - Patient should continue to be weight-bear as tolerated to right lower extremity -patient should continue physical therapy  working on gait training and lower extremity strengthening. - Patient should follow-up in 6 weeks we will get repeat x-rays at that time.

## 2024-03-19 ENCOUNTER — APPOINTMENT (OUTPATIENT)
Dept: ORTHOPEDIC SURGERY | Facility: CLINIC | Age: 84
End: 2024-03-19
Payer: MEDICARE

## 2024-03-19 VITALS — WEIGHT: 130 LBS | BODY MASS INDEX: 23.04 KG/M2 | HEIGHT: 63 IN

## 2024-03-19 PROCEDURE — 99213 OFFICE O/P EST LOW 20 MIN: CPT

## 2024-03-19 PROCEDURE — 73552 X-RAY EXAM OF FEMUR 2/>: CPT | Mod: RT

## 2024-03-19 NOTE — HISTORY OF PRESENT ILLNESS
[de-identified] : 3 months status post intramedullary nailing of right intertrochanteric femur fracture [de-identified] : Overall patient is doing well.  Using cane for ambulation doing well [de-identified] : 2 views of the right femur obtained today my interpretation: Hardware in place, bony healing evident.  No evidence of hardware failure [de-identified] : Right lower extremity  -patient able to stand ambulating with a cane - wound healed - Pain-free range of motion of the hip - Sensation intact light touch distally - Leg warm well-perfused - Patient able to flex and extend knee dorsiflex proximal ankle and toes [de-identified] : 3 months status post intramedullary nailing of right intertrochanteric femur fracture - Patient progressing well wounds healing appropriately - X-rays show hardware in place no evidence of failure - Patient should continue to be weight-bear as tolerated to right lower extremity -patient should continue physical therapy  working on gait training and lower extremity strengthening. - Patient should follow-up in 3 months we will get repeat x-rays at that time.

## 2024-05-07 ENCOUNTER — APPOINTMENT (OUTPATIENT)
Dept: ORTHOPEDIC SURGERY | Facility: CLINIC | Age: 84
End: 2024-05-07
Payer: MEDICARE

## 2024-05-07 VITALS — WEIGHT: 130 LBS | BODY MASS INDEX: 23.04 KG/M2 | HEIGHT: 63 IN

## 2024-05-07 DIAGNOSIS — S72.141D DISPLACED INTERTROCHANTERIC FRACTURE OF RIGHT FEMUR, SUBSEQUENT ENCOUNTER FOR CLOSED FRACTURE WITH ROUTINE HEALING: ICD-10-CM

## 2024-05-07 PROCEDURE — 73552 X-RAY EXAM OF FEMUR 2/>: CPT | Mod: RT

## 2024-05-07 PROCEDURE — 99213 OFFICE O/P EST LOW 20 MIN: CPT

## 2024-06-13 PROBLEM — S72.141D CLOSED DISPLACED INTERTROCHANTERIC FRACTURE OF RIGHT FEMUR WITH ROUTINE HEALING, SUBSEQUENT ENCOUNTER: Status: ACTIVE | Noted: 2024-01-04

## 2024-06-13 NOTE — HISTORY OF PRESENT ILLNESS
[de-identified] : 6months status post intramedullary nailing of right intertrochanteric femur fracture [de-identified] : Overall patient is doing well.  Using cane for ambulation doing well [de-identified] : Right lower extremity  -patient able to stand ambulating with a cane - wound healed - Pain-free range of motion of the hip - Sensation intact light touch distally - Leg warm well-perfused - Patient able to flex and extend knee dorsiflex proximal ankle and toes [de-identified] : 2 views of the right femur obtained today my interpretation: Hardware in place, fracture healed [de-identified] : 6 months status post intramedullary nailing of right intertrochanteric femur fracture - Patient progressing well wounds healing appropriately - X-rays show healed fracture - Patient should continue to be weight-bear as tolerated to right lower extremity -patient should continue physical therapy  working on gait training and lower extremity strengthening. - Patient should follow-up in 6 months we will get repeat x-rays at that time.

## 2024-11-12 ENCOUNTER — APPOINTMENT (OUTPATIENT)
Dept: ORTHOPEDIC SURGERY | Facility: CLINIC | Age: 84
End: 2024-11-12

## 2024-11-12 VITALS — BODY MASS INDEX: 23.04 KG/M2 | HEIGHT: 63 IN | WEIGHT: 130 LBS

## 2024-11-12 PROCEDURE — 99213 OFFICE O/P EST LOW 20 MIN: CPT

## 2024-11-12 PROCEDURE — 73552 X-RAY EXAM OF FEMUR 2/>: CPT | Mod: RT

## (undated) DEVICE — SUT POLYSORB 1 36" GS-21 UNDYED

## (undated) DEVICE — DRAPE C ARM UNIVERSAL

## (undated) DEVICE — PREP CHLORAPREP HI-LITE ORANGE 26ML

## (undated) DEVICE — POSITIONER FOAM EGG CRATE ULNAR 2PCS (PINK)

## (undated) DEVICE — SUT POLYSORB 0 30" GS-21 UNDYED

## (undated) DEVICE — SUT POLYSORB 2-0 30" GS-21 UNDYED

## (undated) DEVICE — MEDICATION LABELS W MARKER

## (undated) DEVICE — WARMING BLANKET UPPER ADULT

## (undated) DEVICE — DRSG TEGADERM 6"X8"

## (undated) DEVICE — DRAPE TOWEL BLUE 17" X 24"

## (undated) DEVICE — DRILL ANTI ROTATION

## (undated) DEVICE — SOL IRR POUR NS 0.9% 500ML

## (undated) DEVICE — SOL IRR POUR H2O 250ML

## (undated) DEVICE — SYR LUER LOK 10CC

## (undated) DEVICE — WOUND IRR SURGIPHOR

## (undated) DEVICE — TAPE SILK 3"

## (undated) DEVICE — GLV 7.5 PROTEXIS (WHITE)

## (undated) DEVICE — STAPLER SKIN VISI-STAT 35 WIDE

## (undated) DEVICE — DRSG AQUACEL 3.5 X 12"

## (undated) DEVICE — DRAPE 3/4 SHEET W REINFORCEMENT 56X77"

## (undated) DEVICE — GLV 8.5 PROTEXIS (WHITE)

## (undated) DEVICE — VENODYNE/SCD SLEEVE CALF LARGE

## (undated) DEVICE — DRSG WEBRIL 6"

## (undated) DEVICE — NDL HYPO SAFE 22G X 1.5" (BLACK)

## (undated) DEVICE — DRSG CURITY GAUZE SPONGE 4 X 4" 12-PLY

## (undated) DEVICE — PACK HIP PINNING

## (undated) DEVICE — GLV 8 PROTEXIS (WHITE)